# Patient Record
Sex: MALE | Race: WHITE | NOT HISPANIC OR LATINO | Employment: OTHER | URBAN - METROPOLITAN AREA
[De-identification: names, ages, dates, MRNs, and addresses within clinical notes are randomized per-mention and may not be internally consistent; named-entity substitution may affect disease eponyms.]

---

## 2017-01-09 ENCOUNTER — OFFICE VISIT (OUTPATIENT)
Dept: FAMILY MEDICINE CLINIC | Facility: CLINIC | Age: 77
End: 2017-01-09

## 2017-01-09 VITALS
SYSTOLIC BLOOD PRESSURE: 120 MMHG | HEIGHT: 71 IN | TEMPERATURE: 98.7 F | RESPIRATION RATE: 16 BRPM | DIASTOLIC BLOOD PRESSURE: 81 MMHG | HEART RATE: 88 BPM | BODY MASS INDEX: 33.32 KG/M2 | WEIGHT: 238 LBS

## 2017-01-09 DIAGNOSIS — M54.42 ACUTE LEFT-SIDED LOW BACK PAIN WITH LEFT-SIDED SCIATICA: Primary | ICD-10-CM

## 2017-01-09 DIAGNOSIS — R20.2 PARESTHESIA OF LEFT LEG: ICD-10-CM

## 2017-01-09 PROBLEM — M54.40 ACUTE LEFT-SIDED LOW BACK PAIN WITH SCIATICA: Status: ACTIVE | Noted: 2017-01-09

## 2017-01-09 PROBLEM — M54.50 ACUTE LEFT-SIDED LOW BACK PAIN WITHOUT SCIATICA: Status: ACTIVE | Noted: 2017-01-09

## 2017-01-09 PROCEDURE — 99213 OFFICE O/P EST LOW 20 MIN: CPT | Performed by: FAMILY MEDICINE

## 2017-01-09 RX ORDER — METHYLPREDNISOLONE 4 MG/1
TABLET ORAL
Qty: 21 TABLET | Refills: 0 | Status: SHIPPED | OUTPATIENT
Start: 2017-01-09 | End: 2017-01-15

## 2017-01-09 RX ORDER — CYCLOBENZAPRINE HCL 10 MG
10 TABLET ORAL 3 TIMES DAILY PRN
Qty: 30 TABLET | Refills: 0 | Status: SHIPPED | OUTPATIENT
Start: 2017-01-09 | End: 2017-01-19

## 2017-01-09 NOTE — MR AVS SNAPSHOT
Pedro Gould   1/9/2017 1:00 PM   Office Visit    Provider:  Hesham Delacruz MD   Department:  [facility]   Dept Phone:  [phone]                Your Full Care Plan              Today's Medication Changes          These changes are accurate as of: 1/9/17  1:26 PM.  If you have any questions, ask your nurse or doctor.               New Medication(s)Ordered:     cyclobenzaprine 10 MG tablet   Commonly known as:  FLEXERIL   Take 1 tablet by mouth 3 (Three) Times a Day As Needed for muscle spasms for up to 10 days.   Started by:  Hesham Delacruz MD       MethylPREDNISolone 4 MG tablet   Commonly known as:  MEDROL (JENNIFER)   Take as directed on package instructions.   Started by:  Hesham Delacruz MD         Stop taking medication(s)listed here:     ergocalciferol 12311 UNITS capsule   Commonly known as:  ERGOCALCIFEROL   Stopped by:  Hesham Delacruz MD                Where to Get Your Medications      These medications were sent to [id number] [place] [address] [phone] [place] [id number] [place] [address]     Phone:  [phone]     cyclobenzaprine 10 MG tablet    MethylPREDNISolone 4 MG tablet                  Your Updated Medication List          This list is accurate as of: 1/9/17  1:26 PM.  Always use your most recent med list.                cyclobenzaprine 10 MG tablet   Commonly known as:  FLEXERIL   Take 1 tablet by mouth 3 (Three) Times a Day As Needed for muscle spasms for up to 10 days.       MethylPREDNISolone 4 MG tablet   Commonly known as:  MEDROL (JENNIFER)   Take as directed on package instructions.       NEXIUM 24HR PO               You Were Diagnosed With        Codes Comments    Acute left-sided low back pain with left-sided sciatica    -  Primary ICD-10-CM: M54.42  ICD-9-CM: 724.2, 724.3     Paresthesia of left leg     ICD-10-CM: R20.2  ICD-9-CM: 782.0     "     Instructions     None    Patient Instructions History      MyChart Signup     [facility] [employer] allows you to send messages to your doctor, view your test results, renew your prescriptions, schedule appointments, and more. To sign up, go to [employer] and click on the Sign Up Now link in the New User? box. Enter your [employer] Activation Code exactly as it appears below along with the last four digits of your Social Security Number and your Date of Birth () to complete the sign-up process. If you do not sign up before the expiration date, you must request a new code.    [employer] Activation Code: [email or web address]  Expires: 2017  1:26 PM    If you have questions, you can email [email or web address] or call [phone] to talk to our [employer] staff. Remember, [employer] is NOT to be used for urgent needs. For medical emergencies, dial 911.               Other Info from Your Visit           Allergies     Celebrex [Celecoxib]  Itching    Penicillins      Percocet [Oxycodone-acetaminophen]  Hallucinations    Hydrocodone  Rash      Reason for Visit     Hip Pain left      Vital Signs     Blood Pressure Pulse Temperature Respirations Height Weight    120/81 88 98.7 °F (37.1 °C) (Oral) 16 70.5\" (179.1 cm) 238 lb (108 kg)    Body Mass Index Smoking Status                33.67 kg/m2 Former Smoker          Problems and Diagnoses Noted     Acute left-sided low back pain without sciatica    Paresthesia of left leg      "

## 2017-10-09 ENCOUNTER — OFFICE VISIT (OUTPATIENT)
Dept: FAMILY MEDICINE CLINIC | Facility: CLINIC | Age: 77
End: 2017-10-09

## 2017-10-09 VITALS
BODY MASS INDEX: 34.86 KG/M2 | WEIGHT: 249 LBS | HEIGHT: 71 IN | TEMPERATURE: 97.9 F | HEART RATE: 67 BPM | RESPIRATION RATE: 16 BRPM | DIASTOLIC BLOOD PRESSURE: 84 MMHG | SYSTOLIC BLOOD PRESSURE: 135 MMHG

## 2017-10-09 DIAGNOSIS — L72.3 SEBACEOUS CYST: Primary | ICD-10-CM

## 2017-10-09 DIAGNOSIS — Z23 IMMUNIZATION DUE: ICD-10-CM

## 2017-10-09 PROBLEM — M54.50 ACUTE LEFT-SIDED LOW BACK PAIN WITHOUT SCIATICA: Status: RESOLVED | Noted: 2017-01-09 | Resolved: 2017-10-09

## 2017-10-09 PROCEDURE — 99213 OFFICE O/P EST LOW 20 MIN: CPT | Performed by: FAMILY MEDICINE

## 2017-10-09 PROCEDURE — G0008 ADMIN INFLUENZA VIRUS VAC: HCPCS | Performed by: FAMILY MEDICINE

## 2017-10-09 PROCEDURE — 90662 IIV NO PRSV INCREASED AG IM: CPT | Performed by: FAMILY MEDICINE

## 2017-10-09 NOTE — PROGRESS NOTES
"Chief Complaint   Patient presents with   • Cyst     back of neck       Subjective   Pt is here to reevaluate posterior neck cyst. It has been present for 14 months and is progressively enlarging. Sx include some itching and discomfort.  He also needs his flu shot today.   I have reviewed and updated his medications, medical history and problem list during today's office visit.        Social History   Substance Use Topics   • Smoking status: Former Smoker   • Smokeless tobacco: Never Used      Comment: PIPE   • Alcohol use No      Comment: occasional       Review of Systems   Skin:        See hpi       Objective   /84  Pulse 67  Temp 97.9 °F (36.6 °C) (Oral)   Resp 16  Ht 70.5\" (179.1 cm)  Wt 249 lb (113 kg)  BMI 35.22 kg/m2  Body mass index is 35.22 kg/(m^2).  Physical Exam   Constitutional: No distress.   Neck:   See photo below   Vitals reviewed.        4 cm diameter cyst, posterior neck  Data Reviewed:        Assessment/Plan     Problem List Items Addressed This Visit        Musculoskeletal and Integument    Sebaceous cyst; posterior left neck - Primary    Relevant Orders    Ambulatory Referral to General Surgery      Other Visit Diagnoses     Immunization due        Relevant Orders    Flu Vaccine High Dose PF 65YR+ (Completed)          Outpatient Encounter Prescriptions as of 10/9/2017   Medication Sig Dispense Refill   • Esomeprazole Magnesium (NEXIUM 24HR PO) Take 1 capsule by mouth As Needed.       No facility-administered encounter medications on file as of 10/9/2017.        Orders Placed This Encounter   Procedures   • Flu Vaccine High Dose PF 65YR+   • Ambulatory Referral to General Surgery     Referral Priority:   Routine     Referral Type:   Consultation     Referral Reason:   Specialty Services Required     Requested Specialty:   General Surgery     Number of Visits Requested:   1       Continue with current treatment plan.         RTC as needed or sooner if symptoms worsen or change  "

## 2017-10-11 ENCOUNTER — OFFICE VISIT (OUTPATIENT)
Dept: SURGERY | Facility: CLINIC | Age: 77
End: 2017-10-11

## 2017-10-11 VITALS
HEIGHT: 70 IN | HEART RATE: 83 BPM | SYSTOLIC BLOOD PRESSURE: 128 MMHG | DIASTOLIC BLOOD PRESSURE: 79 MMHG | WEIGHT: 249.9 LBS | OXYGEN SATURATION: 96 % | BODY MASS INDEX: 35.78 KG/M2

## 2017-10-11 DIAGNOSIS — L72.3 SEBACEOUS CYST: Primary | ICD-10-CM

## 2017-10-11 PROCEDURE — 99203 OFFICE O/P NEW LOW 30 MIN: CPT | Performed by: SURGERY

## 2017-10-11 NOTE — H&P
Chief Complaint   Patient presents with   • Cyst        Patient is a 77 y.o. male referred by Hesham Delacruz MD for Evaluation of a mass on the back of his neck.  Patient reports he first noticed this about a year and a half ago but had increased considerably in size and is sore.  Patient denies fever, chills, nausea or vomiting.  Patient reports it used to be about 2 cm and now it is 4 cm.     Past Medical History:   Diagnosis Date   • Arthritis    • Arthritis of knee, left    • ED (erectile dysfunction)    • Erectile dysfunction    • Family history of diabetes mellitus     sister   • Family history of ischemic heart disease     father   • Family history of malignant neoplasm of gastrointestinal tract     mother   • GERD (gastroesophageal reflux disease)    • GERD (gastroesophageal reflux disease)    • History of colonoscopy     never   • Irritant dermatitis     forearms   • Nocturia    • Rotator cuff syndrome    • Screening for prostate cancer     unknown   • Sensory neuropathy     bilateral foot     Past Surgical History:   Procedure Laterality Date   • EYE SURGERY  2014    laser   • JOINT REPLACEMENT Right 03/16/2010   • KNEE SURGERY  04/16/2010    ALSO 7/10 & 9/10; partial revision due to infection R knee; total removal of R knee hardware; reimplantation R knee   • 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      Vitals:    10/11/17 0905   BP: 128/79   BP  "Location: Left arm   Patient Position: Sitting   Cuff Size: Adult   Pulse: 83   SpO2: 96%   Weight: 249 lb 14.4 oz (113 kg)   Height: 70\" (177.8 cm)       Physical Exam  General/physcological:   Alert and oriented x3 in no acute distress  HEENT: Normal cephalic, atraumatic, PERRLA, EOMI, sclera anicteric, moist mucous membranes, neck is supple, no JVD, no carotid bruits, no thyromegaly no adenopathy, patient has a large raised sebaceous cyst on the posterior aspect of his left side of his neck  Respiratory: CTA and percussion  CVA: RRR, normal S1-S2, no murmurs, no gallops or rubs  GI: Positive BS, soft, nondistended, nontender, no rebound, no guarding, no hernias, no organomegaly and no masses  Musculoskeletal: Full range of motion, no clubbing, no cyanosis or edema  Neurovascular: Grossly intact    Patient does not use tobacco products currently and I have counseled the patient to not start using tobacco products in the future.    Assessment:  Posterior neck sebaceous cyst, symptomatic  Plan:  I have recommended that the patient undergo excision of the sebaceous cyst to prevent symptoms and reoccurring infections.  I have discussed this procedure in detail with the patient.  I have discussed the risk of anesthesia, bleeding, infection, and recurrence.  Patient understands these risk, benefits and alternatives and wishes to proceed.  I have him scheduled at his earliest convenience.    Mary Alice Centeno MD  General, Minimally Invasive and Endoscopic Surgery  [facility]    [address], [address]  [place], [place], [facility]  P: [phone]  F: [phone]    Cc:  Hesham Delacruz MD                      "

## 2017-10-11 NOTE — PROGRESS NOTES
Chief Complaint   Patient presents with   • Cyst        Patient is a 77 y.o. male referred by Hesham Delacruz MD for Evaluation of a mass on the back of his neck.  Patient reports he first noticed this about a year and a half ago but had increased considerably in size and is sore.  Patient denies fever, chills, nausea or vomiting.  Patient reports it used to be about 2 cm and now it is 4 cm.     Past Medical History:   Diagnosis Date   • Arthritis    • Arthritis of knee, left    • ED (erectile dysfunction)    • Erectile dysfunction    • Family history of diabetes mellitus     sister   • Family history of ischemic heart disease     father   • Family history of malignant neoplasm of gastrointestinal tract     mother   • GERD (gastroesophageal reflux disease)    • GERD (gastroesophageal reflux disease)    • History of colonoscopy     never   • Irritant dermatitis     forearms   • Nocturia    • Rotator cuff syndrome    • Screening for prostate cancer     unknown   • Sensory neuropathy     bilateral foot     Past Surgical History:   Procedure Laterality Date   • EYE SURGERY  2014    laser   • JOINT REPLACEMENT Right 03/16/2010   • KNEE SURGERY  04/16/2010    ALSO 7/10 & 9/10; partial revision due to infection R knee; total removal of R knee hardware; reimplantation R knee   • 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      Vitals:    10/11/17 0905   BP: 128/79   BP  "Location: Left arm   Patient Position: Sitting   Cuff Size: Adult   Pulse: 83   SpO2: 96%   Weight: 249 lb 14.4 oz (113 kg)   Height: 70\" (177.8 cm)       Physical Exam  General/physcological:   Alert and oriented x3 in no acute distress  HEENT: Normal cephalic, atraumatic, PERRLA, EOMI, sclera anicteric, moist mucous membranes, neck is supple, no JVD, no carotid bruits, no thyromegaly no adenopathy, patient has a large raised sebaceous cyst on the posterior aspect of his left side of his neck  Respiratory: CTA and percussion  CVA: RRR, normal S1-S2, no murmurs, no gallops or rubs  GI: Positive BS, soft, nondistended, nontender, no rebound, no guarding, no hernias, no organomegaly and no masses  Musculoskeletal: Full range of motion, no clubbing, no cyanosis or edema  Neurovascular: Grossly intact    Patient does not use tobacco products currently and I have counseled the patient to not start using tobacco products in the future.    Assessment:  Posterior neck sebaceous cyst, symptomatic  Plan:  I have recommended that the patient undergo excision of the sebaceous cyst to prevent symptoms and reoccurring infections.  I have discussed this procedure in detail with the patient.  I have discussed the risk of anesthesia, bleeding, infection, and recurrence.  Patient understands these risk, benefits and alternatives and wishes to proceed.  I have him scheduled at his earliest convenience.    Mary Alice Centeno MD  General, Minimally Invasive and Endoscopic Surgery  [facility] [place]    [address], [address]  [place], [place], [facility]  P: [phone]  F: [phone]    Cc:  Hesham Delacruz MD                    "

## 2017-11-03 ENCOUNTER — OUTSIDE FACILITY SERVICE (OUTPATIENT)
Dept: SURGERY | Facility: CLINIC | Age: 77
End: 2017-11-03

## 2017-11-03 PROCEDURE — 11404 EXC TR-EXT B9+MARG 3.1-4 CM: CPT | Performed by: SURGERY

## 2017-11-03 PROCEDURE — 12042 INTMD RPR N-HF/GENIT2.6-7.5: CPT | Performed by: SURGERY

## 2017-11-03 PROCEDURE — 88304 TISSUE EXAM BY PATHOLOGIST: CPT | Performed by: SURGERY

## 2017-11-06 ENCOUNTER — LAB REQUISITION (OUTPATIENT)
Dept: LAB | Facility: HOSPITAL | Age: 77
End: 2017-11-06

## 2017-11-06 DIAGNOSIS — L72.3 SEBACEOUS CYST: ICD-10-CM

## 2017-11-07 LAB
LAB AP CASE REPORT: NORMAL
LAB AP CLINICAL INFORMATION: NORMAL
Lab: NORMAL
PATH REPORT.FINAL DX SPEC: NORMAL
PATH REPORT.GROSS SPEC: NORMAL

## 2017-11-15 ENCOUNTER — OFFICE VISIT (OUTPATIENT)
Dept: SURGERY | Facility: CLINIC | Age: 77
End: 2017-11-15

## 2017-11-15 VITALS
HEART RATE: 79 BPM | OXYGEN SATURATION: 96 % | DIASTOLIC BLOOD PRESSURE: 81 MMHG | SYSTOLIC BLOOD PRESSURE: 129 MMHG | BODY MASS INDEX: 35.13 KG/M2 | HEIGHT: 71 IN | WEIGHT: 250.9 LBS

## 2017-11-15 DIAGNOSIS — Z09 FOLLOW UP: Primary | ICD-10-CM

## 2017-11-15 PROCEDURE — 99024 POSTOP FOLLOW-UP VISIT: CPT | Performed by: SURGERY

## 2017-11-15 NOTE — PROGRESS NOTES
"Chief complaint:  Post-op  Follow up    History of Present Illness    This is Pedro Gould 77 y.o. status post excision of sebaceous cyst and is doing very well.  Patient denies fever, chills, nausea or vomiting.  Patient's pain is well-controlled.      The following portions of the patient's history were reviewed and updated as appropriate: allergies, current medications, past family history, past medical history, past social history, past surgical history and problem list.    Vitals:    11/15/17 0918   BP: 129/81   BP Location: Left arm   Patient Position: Sitting   Cuff Size: Adult   Pulse: 79   SpO2: 96%   Weight: 250 lb 14.4 oz (114 kg)   Height: 70.5\" (179.1 cm)       Physical Exam  Incision is well-healed without evidence of infection.Stitches were removed.    Patient does not use tobacco products currently and I have counseled the patient not to start using tobacco products in the future.    Assessment/plan:    This is Pedro Gould 77 y.o. status post excision of sebaceous cyst and is doing very well.  I have instructed the patient follow-up as needed.    Mary Alice Centeno MD  General, Minimally Invasive and Endoscopic Surgery  [facility]    [address], [address]  [place], [place], [address]  P: [phone]  F: [phone]    Cc:  Hesham Delacruz MD  "

## 2020-02-10 ENCOUNTER — OFFICE VISIT (OUTPATIENT)
Dept: FAMILY MEDICINE CLINIC | Facility: CLINIC | Age: 80
End: 2020-02-10

## 2020-02-10 VITALS
HEIGHT: 71 IN | RESPIRATION RATE: 16 BRPM | SYSTOLIC BLOOD PRESSURE: 120 MMHG | BODY MASS INDEX: 33.18 KG/M2 | TEMPERATURE: 98.4 F | HEART RATE: 75 BPM | DIASTOLIC BLOOD PRESSURE: 80 MMHG | OXYGEN SATURATION: 96 % | WEIGHT: 237 LBS

## 2020-02-10 DIAGNOSIS — J06.9 URI, ACUTE: Primary | ICD-10-CM

## 2020-02-10 PROCEDURE — 99213 OFFICE O/P EST LOW 20 MIN: CPT | Performed by: FAMILY MEDICINE

## 2020-02-10 RX ORDER — DOXYCYCLINE HYCLATE 100 MG
100 TABLET ORAL 2 TIMES DAILY
Qty: 20 TABLET | Refills: 0 | Status: SHIPPED | OUTPATIENT
Start: 2020-02-10 | End: 2020-02-20

## 2020-02-10 NOTE — PATIENT INSTRUCTIONS

## 2020-02-10 NOTE — PROGRESS NOTES
"   Subjective       Chief Complaint   Patient presents with   • Cough   • Wheezing         HPI:       Pedro Gould is a 79 y.o. male who presents to the office today because of cough.  His symptoms started 1 to 2 weeks ago.  He treated with over-the-counter Claritin with some results.  His symptoms return slightly today.  Cough productive of green mucus.  No fever.    I have reviewed and updated his medications, medical history and problem list during today's office visit.     Social History     Tobacco Use   • Smoking status: Former Smoker   • Smokeless tobacco: Never Used   • Tobacco comment: PIPE   Substance Use Topics   • Alcohol use: No     Comment: occasional       Review of Systems   Constitutional: Negative for fever.   HENT: Positive for congestion.    Respiratory: Positive for cough.          PE:   Objective   /80   Pulse 75   Temp 98.4 °F (36.9 °C) (Oral)   Resp 16   Ht 179.1 cm (70.5\")   Wt 108 kg (237 lb)   SpO2 96%   BMI 33.53 kg/m²     Body mass index is 33.53 kg/m².    Physical Exam   Constitutional: He is oriented to person, place, and time. He appears ill (mild).   HENT:   Right Ear: Tympanic membrane normal.   Left Ear: Tympanic membrane normal.   Nose: Mucosal edema present. Right sinus exhibits no maxillary sinus tenderness and no frontal sinus tenderness. Left sinus exhibits no maxillary sinus tenderness and no frontal sinus tenderness.   Mouth/Throat: Posterior oropharyngeal erythema present.   Eyes: Conjunctivae are normal.   Cardiovascular: Normal rate, regular rhythm and normal heart sounds.   Pulmonary/Chest: Effort normal.   Lymphadenopathy:     He has no cervical adenopathy.   Neurological: He is alert and oriented to person, place, and time.   Skin: Skin is warm. He is not diaphoretic.   Psychiatric: He is attentive.        Data Reviewed:                      A/P:     Assessment/Plan   Diagnoses and all orders for this visit:    1. URI, acute (Primary)  Comments:  New " problem.  Suspect viral etiology.  If symptoms persist through Wednesday he will fill doxycycline prescription and take for 10 days  Orders:  -     doxycycline (VIBRAMYICN) 100 MG tablet; Take 1 tablet by mouth 2 (Two) Times a Day for 10 days. No dairy products within 2 hours of a dose  Dispense: 20 tablet; Refill: 0          Follow up:    Return in about 3 months (around 5/10/2020) for Medicare Wellness and regular visit, 30 minutes.

## 2020-12-03 ENCOUNTER — APPOINTMENT (OUTPATIENT)
Dept: CT IMAGING | Facility: HOSPITAL | Age: 80
End: 2020-12-03

## 2020-12-03 ENCOUNTER — APPOINTMENT (OUTPATIENT)
Dept: GENERAL RADIOLOGY | Facility: HOSPITAL | Age: 80
End: 2020-12-03

## 2020-12-03 ENCOUNTER — APPOINTMENT (OUTPATIENT)
Dept: ULTRASOUND IMAGING | Facility: HOSPITAL | Age: 80
End: 2020-12-03

## 2020-12-03 ENCOUNTER — HOSPITAL ENCOUNTER (INPATIENT)
Facility: HOSPITAL | Age: 80
LOS: 3 days | Discharge: HOME | End: 2020-12-06
Attending: EMERGENCY MEDICINE | Admitting: INTERNAL MEDICINE

## 2020-12-03 DIAGNOSIS — K80.10 CALCULUS OF GALLBLADDER WITH CHOLECYSTITIS WITHOUT BILIARY OBSTRUCTION, UNSPECIFIED CHOLECYSTITIS ACUITY: ICD-10-CM

## 2020-12-03 DIAGNOSIS — K85.90 ACUTE PANCREATITIS, UNSPECIFIED COMPLICATION STATUS, UNSPECIFIED PANCREATITIS TYPE: Primary | ICD-10-CM

## 2020-12-03 PROBLEM — D72.829 LEUKOCYTOSIS: Status: ACTIVE | Noted: 2020-12-03

## 2020-12-03 PROBLEM — K80.20 CHOLELITHIASIS: Status: ACTIVE | Noted: 2020-12-03

## 2020-12-03 PROBLEM — R80.9 PROTEINURIA: Status: ACTIVE | Noted: 2020-12-03

## 2020-12-03 PROBLEM — E66.9 OBESITY (BMI 30-39.9): Status: ACTIVE | Noted: 2020-12-03

## 2020-12-03 PROBLEM — E87.1 HYPONATREMIA: Status: ACTIVE | Noted: 2020-12-03

## 2020-12-03 LAB
ALBUMIN SERPL-MCNC: 4.2 G/DL (ref 3.5–5.2)
ALBUMIN/GLOB SERPL: 1.4 G/DL
ALP SERPL-CCNC: 110 U/L (ref 39–117)
ALT SERPL W P-5'-P-CCNC: 161 U/L (ref 1–41)
ANION GAP SERPL CALCULATED.3IONS-SCNC: 9.8 MMOL/L (ref 5–15)
AST SERPL-CCNC: 40 U/L (ref 1–40)
B PARAPERT DNA SPEC QL NAA+PROBE: NOT DETECTED
B PERT DNA SPEC QL NAA+PROBE: NOT DETECTED
BACTERIA UR QL AUTO: ABNORMAL /HPF
BASOPHILS # BLD AUTO: 0.05 10*3/MM3 (ref 0–0.2)
BASOPHILS NFR BLD AUTO: 0.3 % (ref 0–1.5)
BILIRUB SERPL-MCNC: 2.7 MG/DL (ref 0–1.2)
BILIRUB UR QL STRIP: NEGATIVE
BUN SERPL-MCNC: 14 MG/DL (ref 8–23)
BUN/CREAT SERPL: 13.9 (ref 7–25)
C PNEUM DNA NPH QL NAA+NON-PROBE: NOT DETECTED
CALCIUM SPEC-SCNC: 9.1 MG/DL (ref 8.6–10.5)
CHLORIDE SERPL-SCNC: 95 MMOL/L (ref 98–107)
CLARITY UR: CLEAR
CO2 SERPL-SCNC: 26.2 MMOL/L (ref 22–29)
COLOR UR: ABNORMAL
CREAT SERPL-MCNC: 1.01 MG/DL (ref 0.76–1.27)
DEPRECATED RDW RBC AUTO: 41.3 FL (ref 37–54)
EOSINOPHIL # BLD AUTO: 0.05 10*3/MM3 (ref 0–0.4)
EOSINOPHIL NFR BLD AUTO: 0.3 % (ref 0.3–6.2)
ERYTHROCYTE [DISTWIDTH] IN BLOOD BY AUTOMATED COUNT: 12.4 % (ref 12.3–15.4)
FLUAV SUBTYP SPEC NAA+PROBE: NOT DETECTED
FLUBV RNA ISLT QL NAA+PROBE: NOT DETECTED
GFR SERPL CREATININE-BSD FRML MDRD: 71 ML/MIN/1.73
GLOBULIN UR ELPH-MCNC: 2.9 GM/DL
GLUCOSE SERPL-MCNC: 128 MG/DL (ref 65–99)
GLUCOSE UR STRIP-MCNC: NEGATIVE MG/DL
HADV DNA SPEC NAA+PROBE: NOT DETECTED
HCOV 229E RNA SPEC QL NAA+PROBE: NOT DETECTED
HCOV HKU1 RNA SPEC QL NAA+PROBE: NOT DETECTED
HCOV NL63 RNA SPEC QL NAA+PROBE: NOT DETECTED
HCOV OC43 RNA SPEC QL NAA+PROBE: NOT DETECTED
HCT VFR BLD AUTO: 46.5 % (ref 37.5–51)
HGB BLD-MCNC: 15.5 G/DL (ref 13–17.7)
HGB UR QL STRIP.AUTO: ABNORMAL
HMPV RNA NPH QL NAA+NON-PROBE: NOT DETECTED
HOLD SPECIMEN: NORMAL
HOLD SPECIMEN: NORMAL
HPIV1 RNA SPEC QL NAA+PROBE: NOT DETECTED
HPIV2 RNA SPEC QL NAA+PROBE: NOT DETECTED
HPIV3 RNA NPH QL NAA+PROBE: NOT DETECTED
HPIV4 P GENE NPH QL NAA+PROBE: NOT DETECTED
HYALINE CASTS UR QL AUTO: ABNORMAL /LPF
IMM GRANULOCYTES # BLD AUTO: 0.2 10*3/MM3 (ref 0–0.05)
IMM GRANULOCYTES NFR BLD AUTO: 1 % (ref 0–0.5)
KETONES UR QL STRIP: ABNORMAL
LEUKOCYTE ESTERASE UR QL STRIP.AUTO: NEGATIVE
LIPASE SERPL-CCNC: 182 U/L (ref 13–60)
LYMPHOCYTES # BLD AUTO: 0.59 10*3/MM3 (ref 0.7–3.1)
LYMPHOCYTES NFR BLD AUTO: 3 % (ref 19.6–45.3)
M PNEUMO IGG SER IA-ACNC: NOT DETECTED
MCH RBC QN AUTO: 30.2 PG (ref 26.6–33)
MCHC RBC AUTO-ENTMCNC: 33.3 G/DL (ref 31.5–35.7)
MCV RBC AUTO: 90.5 FL (ref 79–97)
MONOCYTES # BLD AUTO: 1.46 10*3/MM3 (ref 0.1–0.9)
MONOCYTES NFR BLD AUTO: 7.4 % (ref 5–12)
NEUTROPHILS NFR BLD AUTO: 17.39 10*3/MM3 (ref 1.7–7)
NEUTROPHILS NFR BLD AUTO: 88 % (ref 42.7–76)
NITRITE UR QL STRIP: NEGATIVE
NRBC BLD AUTO-RTO: 0 /100 WBC (ref 0–0.2)
NT-PROBNP SERPL-MCNC: 176.6 PG/ML (ref 0–1800)
PH UR STRIP.AUTO: 5.5 [PH] (ref 5–8)
PLATELET # BLD AUTO: 247 10*3/MM3 (ref 140–450)
PMV BLD AUTO: 10.7 FL (ref 6–12)
POTASSIUM SERPL-SCNC: 4.3 MMOL/L (ref 3.5–5.2)
PROCALCITONIN SERPL-MCNC: 0.22 NG/ML (ref 0–0.25)
PROT SERPL-MCNC: 7.1 G/DL (ref 6–8.5)
PROT UR QL STRIP: ABNORMAL
QT INTERVAL: 349 MS
RBC # BLD AUTO: 5.14 10*6/MM3 (ref 4.14–5.8)
RBC # UR: ABNORMAL /HPF
REF LAB TEST METHOD: ABNORMAL
RHINOVIRUS RNA SPEC NAA+PROBE: NOT DETECTED
RSV RNA NPH QL NAA+NON-PROBE: NOT DETECTED
SARS-COV-2 RNA NPH QL NAA+NON-PROBE: NOT DETECTED
SODIUM SERPL-SCNC: 131 MMOL/L (ref 136–145)
SP GR UR STRIP: >=1.03 (ref 1–1.03)
SQUAMOUS #/AREA URNS HPF: ABNORMAL /HPF
TROPONIN T SERPL-MCNC: <0.01 NG/ML (ref 0–0.03)
TROPONIN T SERPL-MCNC: <0.01 NG/ML (ref 0–0.03)
UROBILINOGEN UR QL STRIP: ABNORMAL
WBC # BLD AUTO: 19.74 10*3/MM3 (ref 3.4–10.8)
WBC UR QL AUTO: ABNORMAL /HPF
WHOLE BLOOD HOLD SPECIMEN: NORMAL
WHOLE BLOOD HOLD SPECIMEN: NORMAL

## 2020-12-03 PROCEDURE — 93005 ELECTROCARDIOGRAM TRACING: CPT | Performed by: EMERGENCY MEDICINE

## 2020-12-03 PROCEDURE — 93005 ELECTROCARDIOGRAM TRACING: CPT

## 2020-12-03 PROCEDURE — 25010000002 LEVOFLOXACIN PER 250 MG: Performed by: NURSE PRACTITIONER

## 2020-12-03 PROCEDURE — 85025 COMPLETE CBC W/AUTO DIFF WBC: CPT | Performed by: EMERGENCY MEDICINE

## 2020-12-03 PROCEDURE — 81001 URINALYSIS AUTO W/SCOPE: CPT | Performed by: EMERGENCY MEDICINE

## 2020-12-03 PROCEDURE — 83690 ASSAY OF LIPASE: CPT | Performed by: EMERGENCY MEDICINE

## 2020-12-03 PROCEDURE — 84484 ASSAY OF TROPONIN QUANT: CPT | Performed by: EMERGENCY MEDICINE

## 2020-12-03 PROCEDURE — 36415 COLL VENOUS BLD VENIPUNCTURE: CPT

## 2020-12-03 PROCEDURE — 74177 CT ABD & PELVIS W/CONTRAST: CPT

## 2020-12-03 PROCEDURE — 93010 ELECTROCARDIOGRAM REPORT: CPT | Performed by: INTERNAL MEDICINE

## 2020-12-03 PROCEDURE — 71045 X-RAY EXAM CHEST 1 VIEW: CPT

## 2020-12-03 PROCEDURE — 76705 ECHO EXAM OF ABDOMEN: CPT

## 2020-12-03 PROCEDURE — 99284 EMERGENCY DEPT VISIT MOD MDM: CPT

## 2020-12-03 PROCEDURE — 87040 BLOOD CULTURE FOR BACTERIA: CPT | Performed by: NURSE PRACTITIONER

## 2020-12-03 PROCEDURE — 83880 ASSAY OF NATRIURETIC PEPTIDE: CPT | Performed by: EMERGENCY MEDICINE

## 2020-12-03 PROCEDURE — 0202U NFCT DS 22 TRGT SARS-COV-2: CPT | Performed by: EMERGENCY MEDICINE

## 2020-12-03 PROCEDURE — 25010000002 IOPAMIDOL 61 % SOLUTION: Performed by: EMERGENCY MEDICINE

## 2020-12-03 PROCEDURE — 84145 PROCALCITONIN (PCT): CPT | Performed by: EMERGENCY MEDICINE

## 2020-12-03 PROCEDURE — 80053 COMPREHEN METABOLIC PANEL: CPT | Performed by: EMERGENCY MEDICINE

## 2020-12-03 RX ORDER — PANTOPRAZOLE SODIUM 40 MG/10ML
40 INJECTION, POWDER, LYOPHILIZED, FOR SOLUTION INTRAVENOUS
Status: DISCONTINUED | OUTPATIENT
Start: 2020-12-03 | End: 2020-12-06 | Stop reason: HOSPADM

## 2020-12-03 RX ORDER — SODIUM CHLORIDE 0.9 % (FLUSH) 0.9 %
10 SYRINGE (ML) INJECTION AS NEEDED
Status: DISCONTINUED | OUTPATIENT
Start: 2020-12-03 | End: 2020-12-06 | Stop reason: HOSPADM

## 2020-12-03 RX ORDER — SODIUM CHLORIDE 9 MG/ML
125 INJECTION, SOLUTION INTRAVENOUS CONTINUOUS
Status: DISCONTINUED | OUTPATIENT
Start: 2020-12-03 | End: 2020-12-06 | Stop reason: HOSPADM

## 2020-12-03 RX ORDER — ALUMINA, MAGNESIA, AND SIMETHICONE 2400; 2400; 240 MG/30ML; MG/30ML; MG/30ML
15 SUSPENSION ORAL EVERY 6 HOURS PRN
Status: DISCONTINUED | OUTPATIENT
Start: 2020-12-03 | End: 2020-12-06 | Stop reason: HOSPADM

## 2020-12-03 RX ORDER — ONDANSETRON 4 MG/1
4 TABLET, FILM COATED ORAL EVERY 6 HOURS PRN
Status: DISCONTINUED | OUTPATIENT
Start: 2020-12-03 | End: 2020-12-06 | Stop reason: HOSPADM

## 2020-12-03 RX ORDER — NITROGLYCERIN 0.4 MG/1
0.4 TABLET SUBLINGUAL
Status: DISCONTINUED | OUTPATIENT
Start: 2020-12-03 | End: 2020-12-06 | Stop reason: HOSPADM

## 2020-12-03 RX ORDER — SODIUM CHLORIDE 0.9 % (FLUSH) 0.9 %
10 SYRINGE (ML) INJECTION AS NEEDED
Status: DISCONTINUED | OUTPATIENT
Start: 2020-12-03 | End: 2020-12-03

## 2020-12-03 RX ORDER — ONDANSETRON 2 MG/ML
4 INJECTION INTRAMUSCULAR; INTRAVENOUS EVERY 6 HOURS PRN
Status: DISCONTINUED | OUTPATIENT
Start: 2020-12-03 | End: 2020-12-06 | Stop reason: HOSPADM

## 2020-12-03 RX ORDER — BISACODYL 5 MG/1
5 TABLET, DELAYED RELEASE ORAL DAILY PRN
Status: DISCONTINUED | OUTPATIENT
Start: 2020-12-03 | End: 2020-12-06 | Stop reason: HOSPADM

## 2020-12-03 RX ORDER — LEVOFLOXACIN 5 MG/ML
500 INJECTION, SOLUTION INTRAVENOUS EVERY 24 HOURS
Status: DISCONTINUED | OUTPATIENT
Start: 2020-12-03 | End: 2020-12-06 | Stop reason: HOSPADM

## 2020-12-03 RX ORDER — ASPIRIN 325 MG
325 TABLET ORAL ONCE
Status: DISCONTINUED | OUTPATIENT
Start: 2020-12-03 | End: 2020-12-03

## 2020-12-03 RX ORDER — FAMOTIDINE 10 MG/ML
20 INJECTION, SOLUTION INTRAVENOUS EVERY 12 HOURS SCHEDULED
Status: DISCONTINUED | OUTPATIENT
Start: 2020-12-03 | End: 2020-12-03

## 2020-12-03 RX ORDER — BISACODYL 10 MG
10 SUPPOSITORY, RECTAL RECTAL DAILY PRN
Status: DISCONTINUED | OUTPATIENT
Start: 2020-12-03 | End: 2020-12-06 | Stop reason: HOSPADM

## 2020-12-03 RX ADMIN — SODIUM CHLORIDE 125 ML/HR: 9 INJECTION, SOLUTION INTRAVENOUS at 20:43

## 2020-12-03 RX ADMIN — FAMOTIDINE 20 MG: 10 INJECTION INTRAVENOUS at 20:44

## 2020-12-03 RX ADMIN — IOPAMIDOL 100 ML: 612 INJECTION, SOLUTION INTRAVENOUS at 18:00

## 2020-12-03 RX ADMIN — PANTOPRAZOLE SODIUM 40 MG: 40 INJECTION, POWDER, FOR SOLUTION INTRAVENOUS at 23:31

## 2020-12-03 RX ADMIN — LEVOFLOXACIN 500 MG: 5 INJECTION, SOLUTION INTRAVENOUS at 23:31

## 2020-12-03 NOTE — ED PROVIDER NOTES
" EMERGENCY DEPARTMENT ENCOUNTER    Room Number:  BRITT[facility]/I  Date of encounter:  12/3/2020  PCP: Hesham Delacruz MD  Historian: Patient      HPI:  Chief Complaint: Epigastric pain  A complete HPI/ROS/PMH/PSH/SH/FH are unobtainable due to: N/A    Context: Pedro Gould is a 80 y.o. male who presents to the ED c/o feeling a \"knot\" in his epigastrium.  This started Thanksgiving afternoon after he ate pumpkin pie and drink strong coffee.  He took Tums and Nexium the following day which alleviated his symptoms.  His symptoms recurred on Tuesday, he took an additional dose of Nexium and Tums and his symptoms have essentially resolved.  The patient states his wife called his primary care physician who advised that he come to the hospital for further evaluation.  He currently has no complaints.  The epigastric discomfort was not associated with shortness of breath.  He was slightly nauseated but did not vomit.  He has had no black or bloody stools.  No fevers or chills.  No cough, congestion, trouble breathing.  He has had similar episodes of reflux in the past and takes Nexium as needed.      The patient was placed in a mask in triage, hand hygiene was performed before and after my interaction with the patient.  I wore a mask, safety glasses and gloves during my entire interaction with the patient.    PAST MEDICAL HISTORY  Active Ambulatory Problems     Diagnosis Date Noted   • Sensory neuropathy    • GERD (gastroesophageal reflux disease)    • Nocturia    • Tinnitus aurium 12/17/2015   • Vitamin D deficiency 12/17/2015   • Trigger middle finger of right hand 12/01/2016   • Sebaceous cyst; posterior left neck 12/01/2016   • Paresthesia of left leg 01/09/2017     Resolved Ambulatory Problems     Diagnosis Date Noted   • Arthritis of knee, left    • ED (erectile dysfunction)    • Family history of ischemic heart disease    • Family history of diabetes mellitus    • Family history of malignant neoplasm of gastrointestinal tract  "   • Dizziness of unknown cause 12/17/2015   • Acute left-sided low back pain without sciatica 01/09/2017     Past Medical History:   Diagnosis Date   • Arthritis    • Erectile dysfunction    • History of colonoscopy    • Irritant dermatitis    • Rotator cuff syndrome    • Screening for prostate cancer          PAST SURGICAL HISTORY  Past Surgical History:   Procedure Laterality Date   • EYE SURGERY  2014    laser   • JOINT REPLACEMENT Right 03/16/2010   • KNEE SURGERY  04/16/2010    ALSO 7/10 & 9/10; partial revision due to infection R knee; total removal of R knee hardware; reimplantation R knee   • REPLACEMENT TOTAL KNEE Left 09/2015         FAMILY HISTORY  Family History   Problem Relation Age of Onset   • Cancer Mother 55        colon   • Alcohol abuse Father    • Heart disease Father    • Heart attack Father    • Diabetes Other         aunt         SOCIAL HISTORY  Social History     Socioeconomic History   • Marital status:      Spouse name: Not on file   • Number of children: Not on file   • Years of education: Not on file   • Highest education level: Not on file   Tobacco Use   • Smoking status: Former Smoker   • Smokeless tobacco: Never Used   • Tobacco comment: PIPE   Substance and Sexual Activity   • Alcohol use: No     Comment: occasional   • Drug use: No         ALLERGIES  Celebrex [celecoxib], Penicillins, Percocet [oxycodone-acetaminophen], and Hydrocodone        REVIEW OF SYSTEMS  Review of Systems   Constitutional: Negative for activity change, appetite change and fever.   HENT: Negative for congestion and sore throat.    Eyes: Negative.    Respiratory: Negative for cough and shortness of breath.    Cardiovascular: Negative for chest pain and leg swelling.   Gastrointestinal: Positive for abdominal pain and nausea. Negative for diarrhea and vomiting.   Endocrine: Negative.    Genitourinary: Negative for decreased urine volume and dysuria.   Musculoskeletal: Negative for neck pain.   Skin:  Negative for rash and wound.   Allergic/Immunologic: Negative.    Neurological: Negative for weakness, numbness and headaches.   Hematological: Negative.    Psychiatric/Behavioral: Negative.    All other systems reviewed and are negative.       All systems reviewed and negative except for those discussed in HPI.       PHYSICAL EXAM    I have reviewed the triage vital signs and nursing notes.    ED Triage Vitals   Temp Heart Rate Resp BP SpO2   12/03/20 1428 12/03/20 1428 12/03/20 1428 12/03/20 1500 12/03/20 1428   98.8 °F (37.1 °C) (!) 123 18 133/91 96 %      Temp src Heart Rate Source Patient Position BP Location FiO2 (%)   12/03/20 1428 -- 12/03/20 1500 12/03/20 1500 --   Tympanic  Sitting Left arm        Physical Exam   Constitutional: Pt. is oriented to person, place, and time and well-developed, well-nourished, and in no distress. No distress.   HENT: Normocephalic and atraumatic,  EOM are normal. Pupils are equal, round, and reactive to light. Oropharynx moist/nonerythematous.  Neck: Normal range of motion. Neck supple. No JVD present. No tracheal deviation present. No thyromegaly present.   Cardiovascular: Normal rate, regular rhythm and normal heart sounds. Exam reveals no gallop and no friction rub.   No murmur heard.  Pulmonary/Chest: Effort normal and breath sounds normal. No stridor. No respiratory distress. No wheezes, no rales.   Abdominal: Soft. Bowel sounds are normal. No distension. There is no tenderness. There is no rebound and no guarding.   Musculoskeletal: Normal range of motion. No edema, tenderness or deformity.   Neurological: Pt. is alert and oriented to person, place, and time. Pt. has normal sensation and normal strength. No cranial nerve deficit. GCS score is 15.   Skin: Skin is warm and dry. No rash noted. Pt. is not diaphoretic. No erythema.   Psychiatric: Mood, affect and judgment normal.   Nursing note and vitals reviewed.        LAB RESULTS  Recent Results (from the past 24 hour(s))    ECG 12 Lead    Collection Time: 12/03/20  2:32 PM   Result Value Ref Range    QT Interval 349 ms   Comprehensive Metabolic Panel    Collection Time: 12/03/20  4:01 PM    Specimen: Blood   Result Value Ref Range    Glucose 128 (H) 65 - 99 mg/dL    BUN 14 8 - 23 mg/dL    Creatinine 1.01 0.76 - 1.27 mg/dL    Sodium 131 (L) 136 - 145 mmol/L    Potassium 4.3 3.5 - 5.2 mmol/L    Chloride 95 (L) 98 - 107 mmol/L    CO2 26.2 22.0 - 29.0 mmol/L    Calcium 9.1 8.6 - 10.5 mg/dL    Total Protein 7.1 6.0 - 8.5 g/dL    Albumin 4.20 3.50 - 5.20 g/dL    ALT (SGPT) 161 (H) 1 - 41 U/L    AST (SGOT) 40 1 - 40 U/L    Alkaline Phosphatase 110 39 - 117 U/L    Total Bilirubin 2.7 (H) 0.0 - 1.2 mg/dL    eGFR Non African Amer 71 >60 mL/min/1.73    Globulin 2.9 gm/dL    A/G Ratio 1.4 g/dL    BUN/Creatinine Ratio 13.9 7.0 - 25.0    Anion Gap 9.8 5.0 - 15.0 mmol/L   Troponin    Collection Time: 12/03/20  4:01 PM    Specimen: Blood   Result Value Ref Range    Troponin T <0.010 0.000 - 0.030 ng/mL   Light Blue Top    Collection Time: 12/03/20  4:01 PM   Result Value Ref Range    Extra Tube hold for add-on    Green Top (Gel)    Collection Time: 12/03/20  4:01 PM   Result Value Ref Range    Extra Tube Hold for add-ons.    Lavender Top    Collection Time: 12/03/20  4:01 PM   Result Value Ref Range    Extra Tube hold for add-on    Gold Top - SST    Collection Time: 12/03/20  4:01 PM   Result Value Ref Range    Extra Tube Hold for add-ons.    CBC Auto Differential    Collection Time: 12/03/20  4:01 PM    Specimen: Blood   Result Value Ref Range    WBC 19.74 (H) 3.40 - 10.80 10*3/mm3    RBC 5.14 4.14 - 5.80 10*6/mm3    Hemoglobin 15.5 13.0 - 17.7 g/dL    Hematocrit 46.5 37.5 - 51.0 %    MCV 90.5 79.0 - 97.0 fL    MCH 30.2 26.6 - 33.0 pg    MCHC 33.3 31.5 - 35.7 g/dL    RDW 12.4 12.3 - 15.4 %    RDW-SD 41.3 37.0 - 54.0 fl    MPV 10.7 6.0 - 12.0 fL    Platelets 247 140 - 450 10*3/mm3    Neutrophil % 88.0 (H) 42.7 - 76.0 %    Lymphocyte % 3.0 (L) 19.6 -  45.3 %    Monocyte % 7.4 5.0 - 12.0 %    Eosinophil % 0.3 0.3 - 6.2 %    Basophil % 0.3 0.0 - 1.5 %    Immature Grans % 1.0 (H) 0.0 - 0.5 %    Neutrophils, Absolute 17.39 (H) 1.70 - 7.00 10*3/mm3    Lymphocytes, Absolute 0.59 (L) 0.70 - 3.10 10*3/mm3    Monocytes, Absolute 1.46 (H) 0.10 - 0.90 10*3/mm3    Eosinophils, Absolute 0.05 0.00 - 0.40 10*3/mm3    Basophils, Absolute 0.05 0.00 - 0.20 10*3/mm3    Immature Grans, Absolute 0.20 (H) 0.00 - 0.05 10*3/mm3    nRBC 0.0 0.0 - 0.2 /100 WBC   BNP    Collection Time: 12/03/20  4:01 PM    Specimen: Blood   Result Value Ref Range    proBNP 176.6 0.0-1,800.0 pg/mL   Troponin    Collection Time: 12/03/20  4:40 PM    Specimen: Blood   Result Value Ref Range    Troponin T <0.010 0.000 - 0.030 ng/mL   Lipase    Collection Time: 12/03/20  4:40 PM    Specimen: Blood   Result Value Ref Range    Lipase 182 (H) 13 - 60 U/L   Procalcitonin    Collection Time: 12/03/20  4:40 PM    Specimen: Blood   Result Value Ref Range    Procalcitonin 0.22 0.00 - 0.25 ng/mL   Urinalysis With Microscopic If Indicated (No Culture) - Urine, Clean Catch    Collection Time: 12/03/20  5:32 PM    Specimen: Urine, Clean Catch   Result Value Ref Range    Color, UA Orange (A) Yellow, Straw    Appearance, UA Clear Clear    pH, UA 5.5 5.0 - 8.0    Specific Gravity, UA >=1.030 1.005 - 1.030    Glucose, UA Negative Negative    Ketones, UA 80 mg/dL (3+) (A) Negative    Bilirubin, UA Negative Negative    Blood, UA Moderate (2+) (A) Negative    Protein, UA >=300 mg/dL (3+) (A) Negative    Leuk Esterase, UA Negative Negative    Nitrite, UA Negative Negative    Urobilinogen, UA 1.0 E.U./dL 0.2 - 1.0 E.U./dL   Urinalysis, Microscopic Only - Urine, Clean Catch    Collection Time: 12/03/20  5:32 PM    Specimen: Urine, Clean Catch   Result Value Ref Range    RBC, UA 3-5 (A) None Seen, 0-2 /HPF    WBC, UA 0-2 None Seen, 0-2 /HPF    Bacteria, UA None Seen None Seen /HPF    Squamous Epithelial Cells, UA 0-2 None Seen, 0-2  /HPF    Hyaline Casts, UA None Seen None Seen /LPF    Methodology Manual Light Microscopy    Respiratory Panel PCR w/COVID-19(SARS-CoV-2) DAVID/SALVADOR/JENNIFER/[facility]/PAKO In-House, NP Swab in UTM/VTM, 3-4 HR TAT - Swab, Nasopharynx    Collection Time: 12/03/20  7:36 PM    Specimen: Nasopharynx; Swab   Result Value Ref Range    ADENOVIRUS, PCR Not Detected Not Detected    Coronavirus 229E Not Detected Not Detected    Coronavirus HKU1 Not Detected Not Detected    Coronavirus NL63 Not Detected Not Detected    Coronavirus OC43 Not Detected Not Detected    COVID19 Not Detected Not Detected - Ref. Range    Human Metapneumovirus Not Detected Not Detected    Human Rhinovirus/Enterovirus Not Detected Not Detected    Influenza A PCR Not Detected Not Detected    Influenza B PCR Not Detected Not Detected    Parainfluenza Virus 1 Not Detected Not Detected    Parainfluenza Virus 2 Not Detected Not Detected    Parainfluenza Virus 3 Not Detected Not Detected    Parainfluenza Virus 4 Not Detected Not Detected    RSV, PCR Not Detected Not Detected    Bordetella pertussis pcr Not Detected Not Detected    Bordetella parapertussis PCR Not Detected Not Detected    Chlamydophila pneumoniae PCR Not Detected Not Detected    Mycoplasma pneumo by PCR Not Detected Not Detected       Ordered the above labs and independently reviewed the results.        RADIOLOGY  Ct Abdomen Pelvis With Contrast    Result Date: 12/3/2020  CT ABDOMEN AND PELVIS WITH IV CONTRAST  HISTORY: Epigastric pain. Pancreatitis.  TECHNIQUE: CT includes axial imaging from the lung bases to the trochanters with IV contrast.  COMPARISON: None  FINDINGS: There is dependent lower lobe atelectasis. A small-to-moderate hiatal hernia is present. There is diffuse fat infiltration of the liver. Multiple gallstones fill the gallbladder without gallbladder distention or pericholecystic inflammation. The splenic size is normal. Adrenal glands appear normal. There is abnormal peripancreatic  stranding that is consistent with pancreatitis. There is no evidence for splenic vein thrombosis. Within the central upper anterior pancreatic body there is a pancreatic low density lesion measuring 1.1 cm. Wall thickening is present in the descending portion of the duodenum most likely related to secondary duodenitis. Small bilateral renal cysts are present. There is no hydronephrosis. There is no bowel dilatation or evidence for bowel obstruction. Sigmoid diverticulosis is present without evidence for diverticulitis.      1. Acute pancreatitis with peripancreatic inflammation. Descending duodenal wall thickening most likely due to secondary duodenitis. 2. 11 mm low-density lesion within the pancreatic body. Current ACR recommendations for management of a less than 1.5 cm incidental pancreatic cyst in a patient of 65-79 years at presentation is to reimage every 2 years x 5 and stop if stable over 10 years. 3. Cholelithiasis. Gallbladder wall is not thickened and there is no biliary duct dilatation to suggest gallstone pancreatitis. 4. Small hiatal hernia. Bibasilar atelectasis.  Radiation dose reduction techniques were utilized, including automated exposure control and exposure modulation based on body size.  This report was finalized on 12/3/2020 6:43 PM by Dr. Hema Lynne M.D.      Xr Chest 1 View    Result Date: 12/3/2020  CHEST SINGLE VIEW  HISTORY: Mid chest pain. Hiatal hernia.  COMPARISON: None  FINDINGS: Heart size is borderline enlarged. Aortic vascular calcifications are present. The lungs appear clear and there is no evidence for pulmonary edema or pleural effusion or infiltrate. Cardiac monitor and leads are noted.      No evidence for active disease in the chest.  This report was finalized on 12/3/2020 5:40 PM by Dr. Hema Lynne M.D.        I ordered the above noted radiological studies. Reviewed by me and discussed with radiologist.  See dictation for official radiology  interpretation.      PROCEDURES    Procedures      MEDICATIONS GIVEN IN ER    Medications   sodium chloride 0.9 % flush 10 mL (has no administration in time range)   nitroglycerin (NITROSTAT) SL tablet 0.4 mg (has no administration in time range)   bisacodyl (DULCOLAX) suppository 10 mg (has no administration in time range)   bisacodyl (DULCOLAX) EC tablet 5 mg (has no administration in time range)   ondansetron (ZOFRAN) tablet 4 mg (has no administration in time range)     Or   ondansetron (ZOFRAN) injection 4 mg (has no administration in time range)   aluminum-magnesium hydroxide-simethicone (MAALOX MAX) 400-400-40 MG/5ML suspension 15 mL (has no administration in time range)   famotidine (PEPCID) injection 20 mg (20 mg Intravenous Given 12/3/20 2044)   sodium chloride 0.9 % infusion (125 mL/hr Intravenous New Bag 12/3/20 2043)   levoFLOXacin (LEVAQUIN) 500 mg/100 mL D5W (premix) (LEVAQUIN) 500 mg (has no administration in time range)   iopamidol (ISOVUE-300) 61 % injection 100 mL (100 mL Intravenous Given by Other 12/3/20 1800)         PROGRESS, DATA ANALYSIS, CONSULTS, AND MEDICAL DECISION MAKING    Any/all labs have been independently reviewed by me.  Any/all radiology studies have been reviewed by me and discussed with radiologist dictating the report.   EKG's independently viewed and interpreted by me.  Discussion below represents my analysis of pertinent findings related to patient's condition, differential diagnosis, treatment plan and final disposition.      ED Course as of Dec 03 2131   Thu Dec 03, 2020   1632 EKG performed at 1432 and interpreted by me shows sinus tachycardia with a heart of 103 bpm.  WV intervals are normal.  There is a right bundle branch block and left anterior fascicular block with nonspecific ST-T changes.  There are no prior EKGs available for comparison.    [WC]   1642 Symptoms are likely GI in origin.  CBC, CMP, troponin and BNP will be obtained.    [WC]   1652 WBC(!): 19.74 [WC]    1652 Hemoglobin: 15.5 [WC]   1652 Hematocrit: 46.5 [WC]   1652 Platelets: 247 [WC]   1730 X-ray is unremarkable.    [WC]   1734 Lipase(!): 182 [WC]   1739 ALT (SGPT)(!): 161 [WC]   1739 Total Bilirubin(!): 2.7 [WC]   1857 Case discussed with Dr. Villanueva ([facility])-accepts the patient for admission to a telemetry bed.    [WC]   2045 COVID19: Not Detected [WC]      ED Course User Index  [WC] Franko Davey MD       AS OF 21:31 EST VITALS:    BP - 123/70  HR - 94  TEMP - 98.8 °F (37.1 °C) (Tympanic)  02 SATS - 92%        DIAGNOSIS  Final diagnoses:   Acute pancreatitis, unspecified complication status, unspecified pancreatitis type         DISPOSITION  Admitted-telemetry           Franko Davey MD  12/03/20 1858       Franko Davey MD  12/03/20 2131

## 2020-12-04 ENCOUNTER — APPOINTMENT (OUTPATIENT)
Dept: MRI IMAGING | Facility: HOSPITAL | Age: 80
End: 2020-12-04

## 2020-12-04 PROBLEM — K80.10 CALCULUS OF GALLBLADDER WITH CHOLECYSTITIS WITHOUT BILIARY OBSTRUCTION: Status: ACTIVE | Noted: 2020-12-03

## 2020-12-04 PROBLEM — K85.10 ACUTE BILIARY PANCREATITIS WITHOUT INFECTION OR NECROSIS: Status: ACTIVE | Noted: 2020-12-03

## 2020-12-04 LAB
ALBUMIN SERPL-MCNC: 3.7 G/DL (ref 3.5–5.2)
ALBUMIN/GLOB SERPL: 1.4 G/DL
ALP SERPL-CCNC: 90 U/L (ref 39–117)
ALT SERPL W P-5'-P-CCNC: 103 U/L (ref 1–41)
ANION GAP SERPL CALCULATED.3IONS-SCNC: 10.7 MMOL/L (ref 5–15)
AST SERPL-CCNC: 29 U/L (ref 1–40)
BACTERIA UR QL AUTO: ABNORMAL /HPF
BILIRUB SERPL-MCNC: 2 MG/DL (ref 0–1.2)
BILIRUB UR QL STRIP: NEGATIVE
BUN SERPL-MCNC: 14 MG/DL (ref 8–23)
BUN/CREAT SERPL: 15.4 (ref 7–25)
CALCIUM SPEC-SCNC: 8.5 MG/DL (ref 8.6–10.5)
CHLORIDE SERPL-SCNC: 99 MMOL/L (ref 98–107)
CLARITY UR: CLEAR
CO2 SERPL-SCNC: 24.3 MMOL/L (ref 22–29)
COARSE GRAN CASTS URNS QL MICRO: ABNORMAL /LPF
COLOR UR: ABNORMAL
CREAT SERPL-MCNC: 0.91 MG/DL (ref 0.76–1.27)
CREAT UR-MCNC: 164 MG/DL
DEPRECATED RDW RBC AUTO: 38.5 FL (ref 37–54)
ERYTHROCYTE [DISTWIDTH] IN BLOOD BY AUTOMATED COUNT: 12.2 % (ref 12.3–15.4)
GFR SERPL CREATININE-BSD FRML MDRD: 81 ML/MIN/1.73
GLOBULIN UR ELPH-MCNC: 2.7 GM/DL
GLUCOSE SERPL-MCNC: 131 MG/DL (ref 65–99)
GLUCOSE UR STRIP-MCNC: NEGATIVE MG/DL
HBA1C MFR BLD: 6.2 % (ref 4.8–5.6)
HCT VFR BLD AUTO: 39.3 % (ref 37.5–51)
HGB BLD-MCNC: 13.3 G/DL (ref 13–17.7)
HGB UR QL STRIP.AUTO: ABNORMAL
HYALINE CASTS UR QL AUTO: ABNORMAL /LPF
INR PPP: 1.22 (ref 0.9–1.1)
KETONES UR QL STRIP: ABNORMAL
LEUKOCYTE ESTERASE UR QL STRIP.AUTO: NEGATIVE
LIPASE SERPL-CCNC: 84 U/L (ref 13–60)
MCH RBC QN AUTO: 29.6 PG (ref 26.6–33)
MCHC RBC AUTO-ENTMCNC: 33.8 G/DL (ref 31.5–35.7)
MCV RBC AUTO: 87.3 FL (ref 79–97)
NITRITE UR QL STRIP: NEGATIVE
PH UR STRIP.AUTO: 5.5 [PH] (ref 5–8)
PLATELET # BLD AUTO: 226 10*3/MM3 (ref 140–450)
PMV BLD AUTO: 10.5 FL (ref 6–12)
POTASSIUM SERPL-SCNC: 3.8 MMOL/L (ref 3.5–5.2)
PROT SERPL-MCNC: 6.4 G/DL (ref 6–8.5)
PROT UR QL STRIP: ABNORMAL
PROT UR-MCNC: 131 MG/DL
PROT/CREAT UR: 798.8 MG/G CREA (ref 0–200)
PROTHROMBIN TIME: 15.2 SECONDS (ref 11.7–14.2)
RBC # BLD AUTO: 4.5 10*6/MM3 (ref 4.14–5.8)
RBC # UR: ABNORMAL /HPF
REF LAB TEST METHOD: ABNORMAL
SODIUM SERPL-SCNC: 134 MMOL/L (ref 136–145)
SP GR UR STRIP: >=1.03 (ref 1–1.03)
SQUAMOUS #/AREA URNS HPF: ABNORMAL /HPF
UROBILINOGEN UR QL STRIP: ABNORMAL
WBC # BLD AUTO: 18.12 10*3/MM3 (ref 3.4–10.8)
WBC UR QL AUTO: ABNORMAL /HPF

## 2020-12-04 PROCEDURE — 80053 COMPREHEN METABOLIC PANEL: CPT | Performed by: NURSE PRACTITIONER

## 2020-12-04 PROCEDURE — 85027 COMPLETE CBC AUTOMATED: CPT | Performed by: NURSE PRACTITIONER

## 2020-12-04 PROCEDURE — 99222 1ST HOSP IP/OBS MODERATE 55: CPT | Performed by: SURGERY

## 2020-12-04 PROCEDURE — 84156 ASSAY OF PROTEIN URINE: CPT | Performed by: HOSPITALIST

## 2020-12-04 PROCEDURE — 74183 MRI ABD W/O CNTR FLWD CNTR: CPT

## 2020-12-04 PROCEDURE — 25010000002 LEVOFLOXACIN PER 250 MG: Performed by: NURSE PRACTITIONER

## 2020-12-04 PROCEDURE — 25010000002 ENOXAPARIN PER 10 MG: Performed by: INTERNAL MEDICINE

## 2020-12-04 PROCEDURE — 0 GADOBENATE DIMEGLUMINE 529 MG/ML SOLUTION: Performed by: HOSPITALIST

## 2020-12-04 PROCEDURE — A9577 INJ MULTIHANCE: HCPCS | Performed by: HOSPITALIST

## 2020-12-04 PROCEDURE — 83690 ASSAY OF LIPASE: CPT | Performed by: NURSE PRACTITIONER

## 2020-12-04 PROCEDURE — 81001 URINALYSIS AUTO W/SCOPE: CPT | Performed by: HOSPITALIST

## 2020-12-04 PROCEDURE — 85610 PROTHROMBIN TIME: CPT | Performed by: NURSE PRACTITIONER

## 2020-12-04 PROCEDURE — 83036 HEMOGLOBIN GLYCOSYLATED A1C: CPT | Performed by: INTERNAL MEDICINE

## 2020-12-04 PROCEDURE — 82570 ASSAY OF URINE CREATININE: CPT | Performed by: HOSPITALIST

## 2020-12-04 RX ORDER — LORAZEPAM 1 MG/1
1 TABLET ORAL ONCE
Status: COMPLETED | OUTPATIENT
Start: 2020-12-04 | End: 2020-12-04

## 2020-12-04 RX ADMIN — PANTOPRAZOLE SODIUM 40 MG: 40 INJECTION, POWDER, FOR SOLUTION INTRAVENOUS at 11:48

## 2020-12-04 RX ADMIN — SODIUM CHLORIDE 125 ML/HR: 9 INJECTION, SOLUTION INTRAVENOUS at 14:00

## 2020-12-04 RX ADMIN — GADOBENATE DIMEGLUMINE 20 ML: 529 INJECTION, SOLUTION INTRAVENOUS at 09:03

## 2020-12-04 RX ADMIN — LORAZEPAM 1 MG: 1 TABLET ORAL at 07:02

## 2020-12-04 RX ADMIN — LEVOFLOXACIN 500 MG: 5 INJECTION, SOLUTION INTRAVENOUS at 21:41

## 2020-12-04 RX ADMIN — ENOXAPARIN SODIUM 40 MG: 40 INJECTION SUBCUTANEOUS at 11:49

## 2020-12-04 NOTE — PLAN OF CARE
Goal Outcome Evaluation:  Plan of Care Reviewed With: patient  Progress: no change  Outcome Summary: VSS. Pt on clear liquid diet unitl midnight. NPO at 00:00 for Lap Deepika tomorrow after MRI today showed multiple gallstones. Pt resting well today, 24 hour urine in progress until 0300 on the 5th. wcm

## 2020-12-04 NOTE — CONSULTS
REASON FOR CONSULT:    Pancreatitis  Cholelithiasis    REQUESTING PHYSICIAN:    Stewart Rhoades M.D.    HISTORY OF PRESENT ILLNESS:    Pedro Gould is a 80 y.o. male who was admitted to the hospital with abdominal pain.  It began on Thanksgiving.  It was centrally located and dull, but intense.  He felt that it might be dyspepsia or acid reflux.  He took some over-the-counter antacids and an extra proton pump inhibitor without significant improvement.  Over the next several days the pain slowly improved, but never really went away.  There was no nausea or vomiting.  There was no diarrhea or constipation.  The pain worsens with meals.  It is not worse with physical activity or movement.  There are no urinary complaints.        Past Medical History:   Diagnosis Date   • Arthritis    • Arthritis of knee, left    • ED (erectile dysfunction)    • Erectile dysfunction    • Family history of diabetes mellitus     sister   • Family history of ischemic heart disease     father   • Family history of malignant neoplasm of gastrointestinal tract     mother   • GERD (gastroesophageal reflux disease)    • GERD (gastroesophageal reflux disease)    • History of colonoscopy     never   • Irritant dermatitis     forearms   • Nocturia    • Rotator cuff syndrome    • Screening for prostate cancer     unknown   • Sensory neuropathy     bilateral foot       Past Surgical History:   Procedure Laterality Date   • EYE SURGERY  2014    laser   • JOINT REPLACEMENT Right 03/16/2010   • KNEE SURGERY  04/16/2010    ALSO 7/10 & 9/10; partial revision due to infection R knee; total removal of R knee hardware; reimplantation R knee   • REPLACEMENT TOTAL KNEE Left 09/2015         Current Facility-Administered Medications:   •  aluminum-magnesium hydroxide-simethicone (MAALOX MAX) 400-400-40 MG/5ML suspension 15 mL, 15 mL, Oral, Q6H PRN, Floyd Good, APRN  •  bisacodyl (DULCOLAX) EC tablet 5 mg, 5 mg, Oral, Daily PRN, Floyd Good  JAIME ANDERSON  •  bisacodyl (DULCOLAX) suppository 10 mg, 10 mg, Rectal, Daily PRN, Floyd Good APRN  •  enoxaparin (LOVENOX) syringe 40 mg, 40 mg, Subcutaneous, Q24H, Stewart Rhoades MD  •  [START ON 12/5/2020] influenza vac split quad (FLUZONE,FLUARIX,AFLURIA,FLULAVAL) injection 0.5 mL, 0.5 mL, Intramuscular, Once, Stewart Rhoades MD  •  levoFLOXacin (LEVAQUIN) 500 mg/100 mL D5W (premix) (LEVAQUIN) 500 mg, 500 mg, Intravenous, Q24H, Floyd Good APRN, 500 mg at 12/03/20 2331  •  nitroglycerin (NITROSTAT) SL tablet 0.4 mg, 0.4 mg, Sublingual, Q5 Min PRN, Floyd Good APRN  •  ondansetron (ZOFRAN) tablet 4 mg, 4 mg, Oral, Q6H PRN **OR** ondansetron (ZOFRAN) injection 4 mg, 4 mg, Intravenous, Q6H PRN, Floyd Good APRN  •  pantoprazole (PROTONIX) injection 40 mg, 40 mg, Intravenous, Q AM, Stewart Rhoades MD, 40 mg at 12/03/20 2331  •  sodium chloride 0.9 % flush 10 mL, 10 mL, Intravenous, PRN, Floyd Good APRN  •  sodium chloride 0.9 % infusion, 125 mL/hr, Intravenous, Continuous, Floyd Good APRN, Stopped at 12/04/20 0806    Allergies   Allergen Reactions   • Celebrex [Celecoxib] Itching   • Penicillins    • Percocet [Oxycodone-Acetaminophen] Hallucinations   • Hydrocodone Rash       Family History   Problem Relation Age of Onset   • Cancer Mother 55        colon   • Alcohol abuse Father    • Heart disease Father    • Heart attack Father    • Diabetes Other         aunt       Social History     Socioeconomic History   • Marital status:      Spouse name: Not on file   • Number of children: Not on file   • Years of education: Not on file   • Highest education level: Not on file   Tobacco Use   • Smoking status: Former Smoker   • Smokeless tobacco: Never Used   • Tobacco comment: PIPE   Substance and Sexual Activity   • Alcohol use: No     Comment: occasional   • Drug use: No   • Sexual activity: Defer       Review of Systems   Constitutional: Negative for  "fever.   HENT: Negative for trouble swallowing.    Eyes: Negative for visual disturbance.   Respiratory: Negative for shortness of breath.    Cardiovascular: Negative for chest pain.   Gastrointestinal: Positive for abdominal pain. Negative for constipation, diarrhea, nausea and vomiting.   Genitourinary: Negative for dysuria.   Musculoskeletal: Negative for arthralgias.   Neurological: Negative for syncope.   Psychiatric/Behavioral: Negative for confusion.       Objective     /82 (BP Location: Right arm, Patient Position: Lying)   Pulse 95   Temp 100.4 °F (38 °C) (Oral)   Resp 18   Ht 180.3 cm (71\")   Wt 110 kg (243 lb)   SpO2 93%   BMI 33.89 kg/m²     Physical Exam  Vitals signs reviewed.   Constitutional:       General: He is not in acute distress.     Appearance: He is well-developed. He is not diaphoretic.   HENT:      Head: Normocephalic and atraumatic.   Eyes:      General: No scleral icterus.     Conjunctiva/sclera: Conjunctivae normal.   Neck:      Musculoskeletal: Neck supple.      Trachea: No tracheal deviation.   Cardiovascular:      Rate and Rhythm: Normal rate and regular rhythm.      Heart sounds: Normal heart sounds. No murmur.   Pulmonary:      Effort: Pulmonary effort is normal. No respiratory distress.      Breath sounds: Normal breath sounds. No wheezing or rales.   Abdominal:      General: Bowel sounds are normal. There is no distension.      Palpations: Abdomen is soft.      Tenderness: There is abdominal tenderness (minimal centrally located tenderness).      Hernia: No hernia is present.   Musculoskeletal:         General: No deformity.   Skin:     General: Skin is warm and dry.   Neurological:      Mental Status: He is alert and oriented to person, place, and time.   Psychiatric:         Mood and Affect: Mood normal.         Behavior: Behavior normal.         DIAGNOSTIC DATA:    WBC 19.74 --> 18.12, Hgb 13.3, platelets 226.  Creatinine 0.91.  AlkP 90, , AST 29, T bili " 2.0.  Lipase 182--> 84    I reviewed the images and the report of CT scan of the abdomen and pelvis performed on 12/3/2020.  It shows cholelithiasis without gallbladder wall thickening or pericholecystic fluid.  There are peripancreatic inflammatory changes.  There is a small cystic lesion in the head of the pancreas.    I reviewed the images of an ultrasound of the right upper quadrant.  Report is not yet available.  There are multiple gallstones.  The gallbladder wall is minimally thickened at 3 mm.  Common bile duct is normal caliber at 6 mm.  There is a trace amount of pericholecystic fluid.    MRI has been completed.  The full complement of images and report are not yet available.    ASSESSMENT:    Cholelithiasis  Mild acute pancreatitis-likely secondary to gallstones  Perhaps also cholecystitis based on the ultrasound images    PLAN:    I will review the MRI when it is available.  He has no specific risk factors for pancreatitis other than gallstones.  He has leukocytosis and the images on ultrasound are suspicious for cholecystitis.  He is on levofloxacin which should provide good coverage of biliary tract organisms.  He and I discussed laparoscopic cholecystectomy to prevent further episodes of pancreatitis.  I will arrange for surgery on 12/5/2020.

## 2020-12-04 NOTE — ED NOTES
PPE per protocol, eye protection, mask, and gloves worn, pt in mask,        Vandana Reyes RN  12/03/20 [id number]

## 2020-12-04 NOTE — PROGRESS NOTES
Name: Pedro Gould ADMIT: 12/3/2020   : 1940  PCP: Hesham Delacruz MD    MRN: [id number] LOS: 1 days   AGE/SEX: 80 y.o. male  ROOM: [facility]     Subjective   Subjective   Feels some better but still has some abdominal pain.  Denies any chest pain or difficulty breathing.    Review of Systems     Objective   Objective   Vital Signs  Temp:  [98.3 °F (36.8 °C)-100.4 °F (38 °C)] 98.3 °F (36.8 °C)  Heart Rate:  [] 87  Resp:  [18] 18  BP: (121-154)/(70-94) 146/88  SpO2:  [92 %-96 %] 94 %  on   ;   Device (Oxygen Therapy): room air  Body mass index is 33.89 kg/m².  Physical Exam  Vitals signs and nursing note reviewed.   Constitutional:       General: He is not in acute distress.     Appearance: Normal appearance. He is well-developed.   Neck:      Vascular: No JVD.      Trachea: No tracheal deviation.   Cardiovascular:      Rate and Rhythm: Normal rate and regular rhythm.      Heart sounds: No murmur.   Pulmonary:      Effort: Pulmonary effort is normal. No respiratory distress.      Breath sounds: Normal breath sounds.   Abdominal:      General: Bowel sounds are normal. There is no distension.      Palpations: Abdomen is soft.      Tenderness: There is abdominal tenderness.   Skin:     General: Skin is warm and dry.   Neurological:      Mental Status: He is alert and oriented to person, place, and time.   Psychiatric:         Behavior: Behavior normal. Behavior is cooperative.         Results Review     I reviewed the patient's new clinical results.  Results from last 7 days   Lab Units 20  0618 20  1601   WBC 10*3/mm3 18.12* 19.74*   HEMOGLOBIN g/dL 13.3 15.5   PLATELETS 10*3/mm3 226 247     Results from last 7 days   Lab Units 20  0618 20  1601   SODIUM mmol/L 134* 131*   POTASSIUM mmol/L 3.8 4.3   CHLORIDE mmol/L 99 95*   CO2 mmol/L 24.3 26.2   BUN mg/dL 14 14   CREATININE mg/dL 0.91 1.01   GLUCOSE mg/dL 131* 128*   Estimated Creatinine Clearance: 84.4 mL/min (by C-G formula  based on SCr of 0.91 mg/dL).  Results from last 7 days   Lab Units 12/04/20  0618 12/03/20  1601   ALBUMIN g/dL 3.70 4.20   BILIRUBIN mg/dL 2.0* 2.7*   ALK PHOS U/L 90 110   AST (SGOT) U/L 29 40   ALT (SGPT) U/L 103* 161*     Results from last 7 days   Lab Units 12/04/20  0618 12/03/20  1601   CALCIUM mg/dL 8.5* 9.1   ALBUMIN g/dL 3.70 4.20     Results from last 7 days   Lab Units 12/03/20  1640   PROCALCITONIN ng/mL 0.22     COVID19   Date Value Ref Range Status   12/03/2020 Not Detected Not Detected - Ref. Range Final     Hemoglobin A1C   Date/Time Value Ref Range Status   12/04/2020 0618 6.20 (H) 4.80 - 5.60 % Final       MRI abdomen w wo contrast mrcp  MRI ABDOMEN WITH AND WITHOUT CONTRAST     HISTORY: Pancreatitis; lipase as high as 182 and trending down and now  at 84.     TECHNIQUE: Multiplanar, multisequence MRI of the abdomen was performed  before and after the IV administration of 20 mL of MultiHance.     COMPARISON: CT abdomen and pelvis 12/03/2020.     FINDINGS:  Moderate to severe hepatic steatosis is present. Colonic diverticulosis  is present within the visualized abdomen. There is a moderate hiatal  hernia.     The gallbladder is at the upper limits of normal to mildly distended and  is filled with gallstones. There is no intra or extrahepatic biliary  ductal dilation. No focal suspicious hepatic lesion is visualized. The  portal and hepatic veins are patent. The splenic vein and artery are  also patent.     There is a 1.2 cm nonenhancing cystic lesion within the pancreatic body.  There is a 0.6 cm cystic lesion within the pancreatic head which appears  to directly abut the main pancreatic duct. There are also a few tiny  focal T2 hyperintensities within the pancreatic tail.  The main  pancreatic duct is nondistended. Peripancreatic ill-defined T2  hyperintense stranding and fluid are present. There is no large segment  of nonenhancement or T1 hyperintensity on precontrast imaging; however,  the most  inferior aspect of the pancreatic head is collimated out of the  field-of-view on early postcontrast imaging. Evaluation of this area is  incomplete. There is an area of T2 hyperintensity within the anterior  superior aspect of the pancreatic head which corresponds with an area of  fat density on CT and also demonstrates signal dropout on fat-saturated  T1-weighted weighted imaging and Luanne Ink artifact on out-of-phase  imaging indicating the presence of macroscopic fat.     IMPRESSION  1.  Findings of acute pancreatitis. No visualized findings of pancreatic  hemorrhage or necrosis. Area of insinuating fat is present within the  pancreatic head as detailed above.  2.  A 1.1 cm hypodense lesion within the pancreatic body while favored  to represent a primary pancreatic cystic lesion, an acute loculated  peripancreatic fluid collection cannot be excluded and therefore  continued attention on follow-up with MRI abdomen in 6 weeks is  recommended to ensure stability and exclude this possibility. The  smaller T2 hyperintense lesions within the pancreatic head and tail  while favored to represent dilated side branches should also be followed  up at this time as well.  3.  Gallbladder is borderline distended and filled with gallstones.  There are no findings of choledocholithiasis. Underlying early  cholecystitis cannot be excluded and correlation with patient history is  recommended with follow-up HIDA scan if clinically indicated.  4.  Hepatic steatosis.  5.  Other findings as above.          Scheduled Medications  enoxaparin, 40 mg, Subcutaneous, Q24H  [START ON 12/5/2020] influenza vaccine, 0.5 mL, Intramuscular, Once  levoFLOXacin, 500 mg, Intravenous, Q24H  pantoprazole, 40 mg, Intravenous, Q AM    Infusions  sodium chloride, 125 mL/hr, Last Rate: 125 mL/hr (12/04/20 1400)    Diet  NPO Diet  Diet Clear Liquid       Assessment/Plan     Active Hospital Problems    Diagnosis  POA   • **Acute biliary pancreatitis  without infection or necrosis [K85.10]  Yes   • Obesity (BMI 30-39.9) [E66.9]  Yes   • Proteinuria [R80.9]  Yes   • Leukocytosis [D72.829]  Yes   • Hyponatremia [E87.1]  Yes   • Calculus of gallbladder with cholecystitis without biliary obstruction [K80.10]  Yes   • GERD (gastroesophageal reflux disease) [K21.9]  Yes      Resolved Hospital Problems   No resolved problems to display.       80 y.o. male admitted with Acute biliary pancreatitis without infection or necrosis.    CT scan reviewed.  Findings suggest acute pancreatitis felt to be gallbladder in origin.  There was an incidental 1.1 cm pancreatic lesion recommend MRI in 6 weeks.  General surgery has seen him and is planning laparoscopic cholecystectomy tomorrow.  Continue current antibiotics.  24-hour urine ordered by admitting regarding proteinuria.  Interesting he had 2+ blood as well.  No RBC and no casts.  Will check CPK.  Repeat UA now that he has been hydrated.  Renal function is normal.      · Lovenox 40 mg SC daily for DVT prophylaxis.  · Full code.  · Discussed with patient and care team on multidisciplinary rounds.  · Anticipate discharge home in 2-3 days.      Cleve Camarena MD  [facility]  12/04/20  15:11 EST

## 2020-12-04 NOTE — PLAN OF CARE
Goal Outcome Evaluation:  Plan of Care Reviewed With: patient  Progress: improving  Outcome Summary: No complaints voiced, denies pain, NPO VSS, NSR on monitor, eyes closed at very short interval, will continue to monitor

## 2020-12-04 NOTE — H&P
"    Patient Name:  Pedro Gould  YOB: 1940  MRN:  [id number]  Admit Date:  12/3/2020  Patient Care Team:  Hesham Delacruz MD as PCP - General (Family Medicine)  Hesham Delacruz MD as PCP - Family Medicine  Hesham Delacruz MD Wright, James, MD      Subjective   History Present Illness     Chief Complaint   Patient presents with   • Chest Pain     states he thinks it indigestion      History of Present Illness   Mr. Gould is a 80 y.o. former smoker with a history of GERD that presents to [facility] complaining of a \"knot in his upper abdomen\".  When asked what brings him to the hospital, he told me he has explained this to many times already and I should look at the information already given in his chart.  Due to patient's agitation, HPI has been taken from ER documentation.  Patient reports he noticed a \"knot\" in his epigastric area on Thanksgiving afternoon after he ate pumpkin pie and drank some strong coffee.  He took some Tums and Nexium the following day which alleviated his symptoms.  His symptoms recurred on Tuesday and at this juncture he took another Nexium and Tums which essentially alleviated his symptoms.  The patient called his primary care provider who advised him come to the hospital for further evaluation.     CT abdominal pelvis shows acute pancreatitis with peripancreatic inflammation, duodenitis,11 mm low-density lesion within the pancreatic body, and cholelithiasis.  No gallbladder wall thickening and no biliary duct dilation noted to suggest gallstone pancreatitis.  Labs obtained show a glucose of 128, sodium 131, anion gap 95, , total bili 2.7, lipase 182, pro-Ned 0.22, and WBC 19.74.  Patient denies any alcohol use.  He also denies chest pain, shortness of breath, fever, loss of taste smell, cough, and chills.    Review of Systems   Constitutional: Negative for chills and fever.   HENT: Negative for congestion and rhinorrhea.    Eyes: Negative for " photophobia and visual disturbance.   Respiratory: Negative for cough and shortness of breath.    Cardiovascular: Negative for chest pain and palpitations.   Gastrointestinal: Positive for abdominal pain and nausea. Negative for constipation, diarrhea and vomiting.   Endocrine: Negative for cold intolerance and heat intolerance.   Genitourinary: Negative for difficulty urinating and dysuria.   Musculoskeletal: Negative for gait problem and joint swelling.   Skin: Negative for rash and wound.   Neurological: Negative for dizziness, light-headedness and headaches.   Psychiatric/Behavioral: Negative for sleep disturbance and suicidal ideas.        Personal History     Past Medical History:   Diagnosis Date   • Arthritis    • Arthritis of knee, left    • ED (erectile dysfunction)    • Erectile dysfunction    • Family history of diabetes mellitus     sister   • Family history of ischemic heart disease     father   • Family history of malignant neoplasm of gastrointestinal tract     mother   • GERD (gastroesophageal reflux disease)    • GERD (gastroesophageal reflux disease)    • History of colonoscopy     never   • Irritant dermatitis     forearms   • Nocturia    • Rotator cuff syndrome    • Screening for prostate cancer     unknown   • Sensory neuropathy     bilateral foot     Past Surgical History:   Procedure Laterality Date   • EYE SURGERY  2014    laser   • JOINT REPLACEMENT Right 03/16/2010   • KNEE SURGERY  04/16/2010    ALSO 7/10 & 9/10; partial revision due to infection R knee; total removal of R knee hardware; reimplantation R knee   • REPLACEMENT TOTAL KNEE Left 09/2015     Family History   Problem Relation Age of Onset   • Cancer Mother 55        colon   • Alcohol abuse Father    • Heart disease Father    • Heart attack Father    • Diabetes Other         aunt     Social History     Tobacco Use   • Smoking status: Former Smoker   • Smokeless tobacco: Never Used   • Tobacco comment: PIPE   Substance Use Topics    • Alcohol use: No     Comment: occasional   • Drug use: No     No current facility-administered medications on file prior to encounter.      Current Outpatient Medications on File Prior to Encounter   Medication Sig Dispense Refill   • Esomeprazole Magnesium (NEXIUM 24HR PO) Take 1 capsule by mouth As Needed.       Allergies   Allergen Reactions   • Celebrex [Celecoxib] Itching   • Penicillins    • Percocet [Oxycodone-Acetaminophen] Hallucinations   • Hydrocodone Rash       Objective    Objective     Vital Signs  Temp:  [98.8 °F (37.1 °C)] 98.8 °F (37.1 °C)  Heart Rate:  [] 94  Resp:  [18] 18  BP: (121-154)/(70-91) 123/70  SpO2:  [92 %-96 %] 92 %  on   ;   Device (Oxygen Therapy): room air  Body mass index is 32.78 kg/m².    Physical Exam  Vitals signs and nursing note reviewed.   Constitutional:       General: He is not in acute distress.     Appearance: He is well-developed. He is obese. He is not toxic-appearing.   HENT:      Head: Normocephalic and atraumatic.   Eyes:      General: No scleral icterus.        Right eye: No discharge.         Left eye: No discharge.      Conjunctiva/sclera: Conjunctivae normal.   Neck:      Musculoskeletal: Normal range of motion and neck supple.      Vascular: No JVD.   Cardiovascular:      Rate and Rhythm: Regular rhythm. Tachycardia present.      Heart sounds: Normal heart sounds. No murmur. No friction rub. No gallop.    Pulmonary:      Effort: Pulmonary effort is normal. No respiratory distress.      Breath sounds: Normal breath sounds. No wheezing or rales.   Abdominal:      General: Bowel sounds are normal. There is no distension.      Palpations: Abdomen is soft.      Tenderness: There is no abdominal tenderness. There is no guarding or rebound.   Musculoskeletal: Normal range of motion.         General: No tenderness or deformity.   Skin:     General: Skin is warm and dry.      Capillary Refill: Capillary refill takes less than 2 seconds.   Neurological:       Mental Status: He is alert and oriented to person, place, and time.   Psychiatric:         Mood and Affect: Affect is angry.         Behavior: Behavior normal.       Results Review:  I reviewed the patient's new clinical results.    Lab Results (last 24 hours)     Procedure Component Value Units Date/Time    CBC & Differential [[id number]]  (Abnormal) Collected: 12/03/20 1601    Specimen: Blood Updated: 12/03/20 1644    Narrative:      The following orders were created for panel order CBC & Differential.  Procedure                               Abnormality         Status                     ---------                               -----------         ------                     CBC Auto Differential[[id number]]        Abnormal            Final result                 Please view results for these tests on the individual orders.    Comprehensive Metabolic Panel [[id number]]  (Abnormal) Collected: 12/03/20 1601    Specimen: Blood Updated: 12/03/20 1710     Glucose 128 mg/dL      BUN 14 mg/dL      Creatinine 1.01 mg/dL      Sodium 131 mmol/L      Potassium 4.3 mmol/L      Chloride 95 mmol/L      CO2 26.2 mmol/L      Calcium 9.1 mg/dL      Total Protein 7.1 g/dL      Albumin 4.20 g/dL      ALT (SGPT) 161 U/L      AST (SGOT) 40 U/L      Alkaline Phosphatase 110 U/L      Total Bilirubin 2.7 mg/dL      eGFR Non African Amer 71 mL/min/1.73      Globulin 2.9 gm/dL      A/G Ratio 1.4 g/dL      BUN/Creatinine Ratio 13.9     Anion Gap 9.8 mmol/L     Narrative:      GFR Normal >60  Chronic Kidney Disease <60  Kidney Failure <15      Troponin [[id number]]  (Normal) Collected: 12/03/20 1601    Specimen: Blood Updated: 12/03/20 1710     Troponin T <0.010 ng/mL     Narrative:      Troponin T Reference Range:  <= 0.03 ng/mL-   Negative for AMI  >0.03 ng/mL-     Abnormal for myocardial necrosis.  Clinicians would have to utilize clinical acumen, EKG, Troponin and serial changes to determine if it is an Acute Myocardial Infarction or  myocardial injury due to an underlying chronic condition.       Results may be falsely decreased if patient taking Biotin.      CBC Auto Differential [[id number]]  (Abnormal) Collected: 12/03/20 1601    Specimen: Blood Updated: 12/03/20 1644     WBC 19.74 10*3/mm3      RBC 5.14 10*6/mm3      Hemoglobin 15.5 g/dL      Hematocrit 46.5 %      MCV 90.5 fL      MCH 30.2 pg      MCHC 33.3 g/dL      RDW 12.4 %      RDW-SD 41.3 fl      MPV 10.7 fL      Platelets 247 10*3/mm3      Neutrophil % 88.0 %      Lymphocyte % 3.0 %      Monocyte % 7.4 %      Eosinophil % 0.3 %      Basophil % 0.3 %      Immature Grans % 1.0 %      Neutrophils, Absolute 17.39 10*3/mm3      Lymphocytes, Absolute 0.59 10*3/mm3      Monocytes, Absolute 1.46 10*3/mm3      Eosinophils, Absolute 0.05 10*3/mm3      Basophils, Absolute 0.05 10*3/mm3      Immature Grans, Absolute 0.20 10*3/mm3      nRBC 0.0 /100 WBC     BNP [[id number]]  (Normal) Collected: 12/03/20 1601    Specimen: Blood Updated: 12/03/20 1707     proBNP 176.6 pg/mL     Narrative:      Among patients with dyspnea, NT-proBNP is highly sensitive for the detection of acute congestive heart failure. In addition NT-proBNP of <300 pg/ml effectively rules out acute congestive heart failure with 99% negative predictive value.    Results may be falsely decreased if patient taking Biotin.      Troponin [[id number]]  (Normal) Collected: 12/03/20 1640    Specimen: Blood Updated: 12/03/20 1717     Troponin T <0.010 ng/mL     Narrative:      Troponin T Reference Range:  <= 0.03 ng/mL-   Negative for AMI  >0.03 ng/mL-     Abnormal for myocardial necrosis.  Clinicians would have to utilize clinical acumen, EKG, Troponin and serial changes to determine if it is an Acute Myocardial Infarction or myocardial injury due to an underlying chronic condition.       Results may be falsely decreased if patient taking Biotin.      Lipase [[id number]]  (Abnormal) Collected: 12/03/20 1640    Specimen: Blood Updated:  "12/03/20 1733     Lipase 182 U/L     Procalcitonin [[id number]]  (Normal) Collected: 12/03/20 1640    Specimen: Blood Updated: 12/03/20 1747     Procalcitonin 0.22 ng/mL     Narrative:      As a Marker for Sepsis (Non-Neonates):   1. <0.5 ng/mL represents a low risk of severe sepsis and/or septic shock.  1. >2 ng/mL represents a high risk of severe sepsis and/or septic shock.    As a Marker for Lower Respiratory Tract Infections that require antibiotic therapy:  PCT on Admission     Antibiotic Therapy             6-12 Hrs later  > 0.5                Strongly Recommended            >0.25 - <0.5         Recommended  0.1 - 0.25           Discouraged                   Remeasure/reassess PCT  <0.1                 Strongly Discouraged          Remeasure/reassess PCT      As 28 day mortality risk marker: \"Change in Procalcitonin Result\" (> 80 % or <=80 %) if Day 0 (or Day 1) and Day 4 values are available. Refer to http://[email or web address]ct-calculator.com/   Change in PCT <=80 %   A decrease of PCT levels below or equal to 80 % defines a positive change in PCT test result representing a higher risk for 28-day all-cause mortality of patients diagnosed with severe sepsis or septic shock.  Change in PCT > 80 %   A decrease of PCT levels of more than 80 % defines a negative change in PCT result representing a lower risk for 28-day all-cause mortality of patients diagnosed with severe sepsis or septic shock.                Results may be falsely decreased if patient taking Biotin.     Urinalysis With Microscopic If Indicated (No Culture) - Urine, Clean Catch [[id number]]  (Abnormal) Collected: 12/03/20 1732    Specimen: Urine, Clean Catch Updated: 12/03/20 1743     Color, UA Orange     Comment: Any Substance that causes an abnormal urine color can alter the accuracy of the chemical reactions.        Appearance, UA Clear     pH, UA 5.5     Specific Gravity, UA >=1.030     Glucose, UA Negative     Ketones, UA 80 mg/dL (3+)     " Bilirubin, UA Negative     Blood, UA Moderate (2+)     Protein, UA >=300 mg/dL (3+)     Leuk Esterase, UA Negative     Nitrite, UA Negative     Urobilinogen, UA 1.0 E.U./dL    Urinalysis, Microscopic Only - Urine, Clean Catch [[id number]]  (Abnormal) Collected: 12/03/20 1732    Specimen: Urine, Clean Catch Updated: 12/03/20 1757     RBC, UA 3-5 /HPF      WBC, UA 0-2 /HPF      Bacteria, UA None Seen /HPF      Squamous Epithelial Cells, UA 0-2 /HPF      Hyaline Casts, UA None Seen /LPF      Methodology Manual Light Microscopy    Respiratory Panel PCR w/COVID-19(SARS-CoV-2) DAVID/SALVADOR/JENNIFER/PAD/COR/MAD/PAKO In-House, NP Swab in UTM/VTM, 3-4 HR TAT - Swab, Nasopharynx [[id number]]  (Normal) Collected: 12/03/20 1936    Specimen: Swab from Nasopharynx Updated: 12/03/20 2037     ADENOVIRUS, PCR Not Detected     Coronavirus 229E Not Detected     Coronavirus HKU1 Not Detected     Coronavirus NL63 Not Detected     Coronavirus OC43 Not Detected     COVID19 Not Detected     Human Metapneumovirus Not Detected     Human Rhinovirus/Enterovirus Not Detected     Influenza A PCR Not Detected     Influenza B PCR Not Detected     Parainfluenza Virus 1 Not Detected     Parainfluenza Virus 2 Not Detected     Parainfluenza Virus 3 Not Detected     Parainfluenza Virus 4 Not Detected     RSV, PCR Not Detected     Bordetella pertussis pcr Not Detected     Bordetella parapertussis PCR Not Detected     Chlamydophila pneumoniae PCR Not Detected     Mycoplasma pneumo by PCR Not Detected    Narrative:      Fact sheet for providers: https://docs.Jamn/wp-content/uploads/ETQ4702-7923-IF3.1-EUA-Provider-Fact-Sheet-3.pdf    Fact sheet for patients: https://docs.Jam[employer]/wp-content/uploads/TTC7744-6595-LD9.1-EUA-Patient-Fact-Sheet-1.pdf          Imaging Results (Last 24 Hours)     Procedure Component Value Units Date/Time    CT Abdomen Pelvis With Contrast [[id number]] Collected: 12/03/20 1833     Updated: 12/03/20 1846    Narrative:      CT  ABDOMEN AND PELVIS WITH IV CONTRAST     HISTORY: Epigastric pain. Pancreatitis.     TECHNIQUE: CT includes axial imaging from the lung bases to the  trochanters with IV contrast.     COMPARISON: None     FINDINGS: There is dependent lower lobe atelectasis. A small-to-moderate  hiatal hernia is present. There is diffuse fat infiltration of the  liver. Multiple gallstones fill the gallbladder without gallbladder  distention or pericholecystic inflammation. The splenic size is normal.  Adrenal glands appear normal. There is abnormal peripancreatic stranding  that is consistent with pancreatitis. There is no evidence for splenic  vein thrombosis. Within the central upper anterior pancreatic body there  is a pancreatic low density lesion measuring 1.1 cm. Wall thickening is  present in the descending portion of the duodenum most likely related to  secondary duodenitis. Small bilateral renal cysts are present. There is  no hydronephrosis. There is no bowel dilatation or evidence for bowel  obstruction. Sigmoid diverticulosis is present without evidence for  diverticulitis.       Impression:      1. Acute pancreatitis with peripancreatic inflammation. Descending  duodenal wall thickening most likely due to secondary duodenitis.  2. 11 mm low-density lesion within the pancreatic body. Current ACR  recommendations for management of a less than 1.5 cm incidental  pancreatic cyst in a patient of 65-79 years at presentation is to  reimage every 2 years x 5 and stop if stable over 10 years.  3. Cholelithiasis. Gallbladder wall is not thickened and there is no  biliary duct dilatation to suggest gallstone pancreatitis.  4. Small hiatal hernia. Bibasilar atelectasis.     Radiation dose reduction techniques were utilized, including automated  exposure control and exposure modulation based on body size.     This report was finalized on 12/3/2020 6:43 PM by Dr. Hema Lynne M.D.       XR Chest 1 View [[id number]] Collected:  12/03/20 1722     Updated: 12/03/20 1743    Narrative:      CHEST SINGLE VIEW     HISTORY: Mid chest pain. Hiatal hernia.     COMPARISON: None     FINDINGS: Heart size is borderline enlarged. Aortic vascular  calcifications are present. The lungs appear clear and there is no  evidence for pulmonary edema or pleural effusion or infiltrate. Cardiac  monitor and leads are noted.       Impression:      No evidence for active disease in the chest.     This report was finalized on 12/3/2020 5:40 PM by Dr. Hema Lynne M.D.                  ECG 12 Lead   Final Result   HEART RATE= 103  bpm   RR Interval= 580  ms   NV Interval= 176  ms   P Horizontal Axis= 1  deg   P Front Axis= 53  deg   QRSD Interval= 148  ms   QT Interval= 349  ms   QRS Axis= -93  deg   T Wave Axis= -10  deg   - ABNORMAL ECG -   Sinus tachycardia   RBBB and LAFB   NO PRIOR TRACING AVAILABLE FOR COMPARISON   Electronically Signed By: Abbey Sutton ([facility]) 03-Dec-2020 17:01:11   Date and Time of Study: 2020-12-03 14:32:37           Assessment/Plan     Active Hospital Problems    Diagnosis  POA   • **Acute pancreatitis [K85.90]  Yes   • Obesity (BMI 30-39.9) [E66.9]  Yes   • GERD (gastroesophageal reflux disease) [K21.9]  Yes      Resolved Hospital Problems   No resolved problems to display.       Mr. Gould is a 80 y.o. former smoker with a history of GERD who presents with epigastric pain secondary to acute pancreatitis.    Acute pancreatitis   -CT abdominal pelvis shows acute pancreatitis with peripancreatic inflammation, duodenitis,11 mm low-density lesion within the pancreatic body, and cholelithiasis.  No gallbladder wall thickening and no biliary duct dilation noted to suggest gallstone pancreatitis.  -Will check gallbladder ultrasound  -WBC 19.74-start Levaquin 500 mg IV daily  -Blood cultures x2  -IV hydration overnight  -N.p.o. diet  -IV Pepcid twice daily  -Check CBC, CMP, lipase, PT/INR in a.m.    GERD  -IV Pepcid to be given twice  daily      I discussed the patient's findings and my recommendations with patient and Dr. Villanueva.    VTE Prophylaxis - SCDs.  Code Status - Full code.       JAIME Eugene  [facility]  12/03/20  20:46 EST

## 2020-12-04 NOTE — PROGRESS NOTES
Discharge Planning Assessment  [facility]     Patient Name: Pedro Gould  MRN: [id number]  Today's Date: 12/4/2020    Admit Date: 12/3/2020    Discharge Needs Assessment     Row Name 12/04/20 0948       Living Environment    Lives With  spouse    Current Living Arrangements  home/apartment/condo    Primary Care Provided by  self    Provides Primary Care For  no one    Family Caregiver if Needed  spouse    Quality of Family Relationships  helpful;involved       Resource/Environmental Concerns    Resource/Environmental Concerns  none       Transition Planning    Patient/Family Anticipates Transition to  home with family    Patient/Family Anticipated Services at Transition  none    Transportation Anticipated  family or friend will provide       Discharge Needs Assessment    Readmission Within the Last 30 Days  no previous admission in last 30 days    Equipment Currently Used at Home  none    Concerns to be Addressed  no discharge needs identified;denies needs/concerns at this time    Equipment Needed After Discharge  none    Provided Post Acute Provider List?  Refused    Refused Provider List Comment  declined    Discharge Coordination/Progress  home no needs        Discharge Plan     Row Name 12/04/20 0949       Plan    Plan  home with family; denies needs    Patient/Family in Agreement with Plan  yes    Plan Comments  Checked IMM. Spoke with pt via phone as he is in isolation. Verified Face sheet. Explained role of CCP. Pt reports he lives with wife on the third floor of [facility] and there is an elevator he uses. Pt is IADLs no DME used to ambulate. Pt reports no history of HH or SNF. Verified PCP and pharmacy, no issues. Pt plans home at d/c. Denies needs. Wife can transport home. CCP to follow…KYLAH Roman        Continued Care and Services - Admitted Since 12/3/2020    Coordination has not been started for this encounter.         Demographic Summary     Row Name 12/04/20 0948       General Information     Admission Type  inpatient    Arrived From  home    Required Notices Provided  Important Message from Medicare    Reason for Consult  discharge planning    Preferred Language  English     Used During This Interaction  no        Functional Status     Row Name 12/04/20 0948       Functional Status    Usual Activity Tolerance  good    Current Activity Tolerance  moderate       Functional Status, IADL    Medications  independent    Meal Preparation  independent    Housekeeping  independent    Laundry  independent    Shopping  independent       Mental Status Summary    Recent Changes in Mental Status/Cognitive Functioning  no changes        Psychosocial    No documentation.       Abuse/Neglect    No documentation.       Legal    No documentation.       Substance Abuse    No documentation.       Patient Forms    No documentation.           SINTIA Downey

## 2020-12-05 ENCOUNTER — ANESTHESIA (OUTPATIENT)
Dept: PERIOP | Facility: HOSPITAL | Age: 80
End: 2020-12-05

## 2020-12-05 ENCOUNTER — APPOINTMENT (OUTPATIENT)
Dept: GENERAL RADIOLOGY | Facility: HOSPITAL | Age: 80
End: 2020-12-05

## 2020-12-05 ENCOUNTER — ANESTHESIA EVENT (OUTPATIENT)
Dept: PERIOP | Facility: HOSPITAL | Age: 80
End: 2020-12-05

## 2020-12-05 LAB
ALBUMIN SERPL-MCNC: 3.3 G/DL (ref 3.5–5.2)
ALBUMIN/GLOB SERPL: 1.2 G/DL
ALP SERPL-CCNC: 88 U/L (ref 39–117)
ALT SERPL W P-5'-P-CCNC: 71 U/L (ref 1–41)
ANION GAP SERPL CALCULATED.3IONS-SCNC: 9.4 MMOL/L (ref 5–15)
AST SERPL-CCNC: 24 U/L (ref 1–40)
BILIRUB SERPL-MCNC: 1.1 MG/DL (ref 0–1.2)
BUN SERPL-MCNC: 10 MG/DL (ref 8–23)
BUN/CREAT SERPL: 12.8 (ref 7–25)
CALCIUM SPEC-SCNC: 8.2 MG/DL (ref 8.6–10.5)
CHLORIDE SERPL-SCNC: 98 MMOL/L (ref 98–107)
CK SERPL-CCNC: 327 U/L (ref 20–200)
CO2 SERPL-SCNC: 23.6 MMOL/L (ref 22–29)
CREAT SERPL-MCNC: 0.78 MG/DL (ref 0.76–1.27)
DEPRECATED RDW RBC AUTO: 38.9 FL (ref 37–54)
ERYTHROCYTE [DISTWIDTH] IN BLOOD BY AUTOMATED COUNT: 12.2 % (ref 12.3–15.4)
GFR SERPL CREATININE-BSD FRML MDRD: 96 ML/MIN/1.73
GLOBULIN UR ELPH-MCNC: 2.7 GM/DL
GLUCOSE SERPL-MCNC: 109 MG/DL (ref 65–99)
HCT VFR BLD AUTO: 37.4 % (ref 37.5–51)
HGB BLD-MCNC: 12.6 G/DL (ref 13–17.7)
MCH RBC QN AUTO: 29.5 PG (ref 26.6–33)
MCHC RBC AUTO-ENTMCNC: 33.7 G/DL (ref 31.5–35.7)
MCV RBC AUTO: 87.6 FL (ref 79–97)
PLATELET # BLD AUTO: 233 10*3/MM3 (ref 140–450)
PMV BLD AUTO: 11 FL (ref 6–12)
POTASSIUM SERPL-SCNC: 3.5 MMOL/L (ref 3.5–5.2)
PROT 24H UR-MRATE: 742 MG/24HOURS (ref 0–150)
PROT SERPL-MCNC: 6 G/DL (ref 6–8.5)
RBC # BLD AUTO: 4.27 10*6/MM3 (ref 4.14–5.8)
SODIUM SERPL-SCNC: 131 MMOL/L (ref 136–145)
WBC # BLD AUTO: 14.37 10*3/MM3 (ref 3.4–10.8)

## 2020-12-05 PROCEDURE — 25010000002 DEXAMETHASONE PER 1 MG: Performed by: NURSE ANESTHETIST, CERTIFIED REGISTERED

## 2020-12-05 PROCEDURE — 25010000002 NEOSTIGMINE PER 0.5 MG: Performed by: NURSE ANESTHETIST, CERTIFIED REGISTERED

## 2020-12-05 PROCEDURE — 81050 URINALYSIS VOLUME MEASURE: CPT | Performed by: INTERNAL MEDICINE

## 2020-12-05 PROCEDURE — 25010000002 PROPOFOL 10 MG/ML EMULSION: Performed by: NURSE ANESTHETIST, CERTIFIED REGISTERED

## 2020-12-05 PROCEDURE — 0FT44ZZ RESECTION OF GALLBLADDER, PERCUTANEOUS ENDOSCOPIC APPROACH: ICD-10-PCS | Performed by: SURGERY

## 2020-12-05 PROCEDURE — 0 IOTHALAMATE 60 % SOLUTION: Performed by: SURGERY

## 2020-12-05 PROCEDURE — 80053 COMPREHEN METABOLIC PANEL: CPT | Performed by: HOSPITALIST

## 2020-12-05 PROCEDURE — 25010000002 LEVOFLOXACIN PER 250 MG: Performed by: SURGERY

## 2020-12-05 PROCEDURE — 85027 COMPLETE CBC AUTOMATED: CPT | Performed by: HOSPITALIST

## 2020-12-05 PROCEDURE — 84156 ASSAY OF PROTEIN URINE: CPT | Performed by: INTERNAL MEDICINE

## 2020-12-05 PROCEDURE — 88304 TISSUE EXAM BY PATHOLOGIST: CPT | Performed by: SURGERY

## 2020-12-05 PROCEDURE — 47563 LAPARO CHOLECYSTECTOMY/GRAPH: CPT | Performed by: SURGERY

## 2020-12-05 PROCEDURE — BF101ZZ FLUOROSCOPY OF BILE DUCTS USING LOW OSMOLAR CONTRAST: ICD-10-PCS | Performed by: SURGERY

## 2020-12-05 PROCEDURE — 25010000002 FENTANYL CITRATE (PF) 100 MCG/2ML SOLUTION: Performed by: NURSE ANESTHETIST, CERTIFIED REGISTERED

## 2020-12-05 PROCEDURE — 25010000002 MIDAZOLAM PER 1 MG: Performed by: ANESTHESIOLOGY

## 2020-12-05 PROCEDURE — 74300 X-RAY BILE DUCTS/PANCREAS: CPT

## 2020-12-05 PROCEDURE — 25010000002 ONDANSETRON PER 1 MG: Performed by: NURSE ANESTHETIST, CERTIFIED REGISTERED

## 2020-12-05 PROCEDURE — 82550 ASSAY OF CK (CPK): CPT | Performed by: HOSPITALIST

## 2020-12-05 PROCEDURE — 47563 LAPARO CHOLECYSTECTOMY/GRAPH: CPT | Performed by: PHYSICIAN ASSISTANT

## 2020-12-05 DEVICE — SEAL HEMO SURG ARISTA/AH ABS/PWDR 3GM: Type: IMPLANTABLE DEVICE | Site: ABDOMEN | Status: FUNCTIONAL

## 2020-12-05 DEVICE — LIGAMAX 5 MM ENDOSCOPIC MULTIPLE CLIP APPLIER
Type: IMPLANTABLE DEVICE | Site: ABDOMEN | Status: FUNCTIONAL
Brand: LIGAMAX

## 2020-12-05 RX ORDER — SODIUM CHLORIDE 0.9 % (FLUSH) 0.9 %
3 SYRINGE (ML) INJECTION EVERY 12 HOURS SCHEDULED
Status: DISCONTINUED | OUTPATIENT
Start: 2020-12-05 | End: 2020-12-05 | Stop reason: HOSPADM

## 2020-12-05 RX ORDER — DEXAMETHASONE SODIUM PHOSPHATE 10 MG/ML
INJECTION INTRAMUSCULAR; INTRAVENOUS AS NEEDED
Status: DISCONTINUED | OUTPATIENT
Start: 2020-12-05 | End: 2020-12-05 | Stop reason: SURG

## 2020-12-05 RX ORDER — GLYCOPYRROLATE 0.2 MG/ML
INJECTION INTRAMUSCULAR; INTRAVENOUS AS NEEDED
Status: DISCONTINUED | OUTPATIENT
Start: 2020-12-05 | End: 2020-12-05 | Stop reason: SURG

## 2020-12-05 RX ORDER — ROCURONIUM BROMIDE 10 MG/ML
INJECTION, SOLUTION INTRAVENOUS AS NEEDED
Status: DISCONTINUED | OUTPATIENT
Start: 2020-12-05 | End: 2020-12-05 | Stop reason: SURG

## 2020-12-05 RX ORDER — FENTANYL CITRATE 50 UG/ML
50 INJECTION, SOLUTION INTRAMUSCULAR; INTRAVENOUS
Status: DISCONTINUED | OUTPATIENT
Start: 2020-12-05 | End: 2020-12-05 | Stop reason: HOSPADM

## 2020-12-05 RX ORDER — LIDOCAINE HYDROCHLORIDE 20 MG/ML
INJECTION, SOLUTION INFILTRATION; PERINEURAL AS NEEDED
Status: DISCONTINUED | OUTPATIENT
Start: 2020-12-05 | End: 2020-12-05 | Stop reason: SURG

## 2020-12-05 RX ORDER — FLUMAZENIL 0.1 MG/ML
0.2 INJECTION INTRAVENOUS AS NEEDED
Status: DISCONTINUED | OUTPATIENT
Start: 2020-12-05 | End: 2020-12-05 | Stop reason: HOSPADM

## 2020-12-05 RX ORDER — HYDROCODONE BITARTRATE AND ACETAMINOPHEN 5; 325 MG/1; MG/1
1 TABLET ORAL EVERY 4 HOURS PRN
Status: DISCONTINUED | OUTPATIENT
Start: 2020-12-05 | End: 2020-12-06 | Stop reason: HOSPADM

## 2020-12-05 RX ORDER — DIPHENHYDRAMINE HYDROCHLORIDE 50 MG/ML
12.5 INJECTION INTRAMUSCULAR; INTRAVENOUS
Status: DISCONTINUED | OUTPATIENT
Start: 2020-12-05 | End: 2020-12-05 | Stop reason: HOSPADM

## 2020-12-05 RX ORDER — SODIUM CHLORIDE 0.9 % (FLUSH) 0.9 %
3-10 SYRINGE (ML) INJECTION AS NEEDED
Status: DISCONTINUED | OUTPATIENT
Start: 2020-12-05 | End: 2020-12-05 | Stop reason: HOSPADM

## 2020-12-05 RX ORDER — NALOXONE HCL 0.4 MG/ML
0.2 VIAL (ML) INJECTION AS NEEDED
Status: DISCONTINUED | OUTPATIENT
Start: 2020-12-05 | End: 2020-12-05 | Stop reason: HOSPADM

## 2020-12-05 RX ORDER — ONDANSETRON 2 MG/ML
4 INJECTION INTRAMUSCULAR; INTRAVENOUS ONCE AS NEEDED
Status: DISCONTINUED | OUTPATIENT
Start: 2020-12-05 | End: 2020-12-05 | Stop reason: HOSPADM

## 2020-12-05 RX ORDER — MAGNESIUM HYDROXIDE 1200 MG/15ML
LIQUID ORAL AS NEEDED
Status: DISCONTINUED | OUTPATIENT
Start: 2020-12-05 | End: 2020-12-05 | Stop reason: HOSPADM

## 2020-12-05 RX ORDER — EPHEDRINE SULFATE 50 MG/ML
5 INJECTION, SOLUTION INTRAVENOUS ONCE AS NEEDED
Status: DISCONTINUED | OUTPATIENT
Start: 2020-12-05 | End: 2020-12-05 | Stop reason: HOSPADM

## 2020-12-05 RX ORDER — BUPIVACAINE HYDROCHLORIDE AND EPINEPHRINE 5; 5 MG/ML; UG/ML
INJECTION, SOLUTION PERINEURAL AS NEEDED
Status: DISCONTINUED | OUTPATIENT
Start: 2020-12-05 | End: 2020-12-05 | Stop reason: HOSPADM

## 2020-12-05 RX ORDER — FENTANYL CITRATE 50 UG/ML
INJECTION, SOLUTION INTRAMUSCULAR; INTRAVENOUS AS NEEDED
Status: DISCONTINUED | OUTPATIENT
Start: 2020-12-05 | End: 2020-12-05 | Stop reason: SURG

## 2020-12-05 RX ORDER — LIDOCAINE HYDROCHLORIDE 10 MG/ML
0.5 INJECTION, SOLUTION EPIDURAL; INFILTRATION; INTRACAUDAL; PERINEURAL ONCE AS NEEDED
Status: DISCONTINUED | OUTPATIENT
Start: 2020-12-05 | End: 2020-12-05 | Stop reason: HOSPADM

## 2020-12-05 RX ORDER — PROPOFOL 10 MG/ML
VIAL (ML) INTRAVENOUS AS NEEDED
Status: DISCONTINUED | OUTPATIENT
Start: 2020-12-05 | End: 2020-12-05 | Stop reason: SURG

## 2020-12-05 RX ORDER — MIDAZOLAM HYDROCHLORIDE 1 MG/ML
1 INJECTION INTRAMUSCULAR; INTRAVENOUS
Status: DISCONTINUED | OUTPATIENT
Start: 2020-12-05 | End: 2020-12-05 | Stop reason: HOSPADM

## 2020-12-05 RX ORDER — HYDROMORPHONE HYDROCHLORIDE 1 MG/ML
0.5 INJECTION, SOLUTION INTRAMUSCULAR; INTRAVENOUS; SUBCUTANEOUS
Status: DISCONTINUED | OUTPATIENT
Start: 2020-12-05 | End: 2020-12-06 | Stop reason: HOSPADM

## 2020-12-05 RX ORDER — ONDANSETRON 2 MG/ML
INJECTION INTRAMUSCULAR; INTRAVENOUS AS NEEDED
Status: DISCONTINUED | OUTPATIENT
Start: 2020-12-05 | End: 2020-12-05 | Stop reason: SURG

## 2020-12-05 RX ORDER — DIPHENHYDRAMINE HCL 25 MG
25 CAPSULE ORAL
Status: DISCONTINUED | OUTPATIENT
Start: 2020-12-05 | End: 2020-12-05 | Stop reason: HOSPADM

## 2020-12-05 RX ORDER — SODIUM CHLORIDE, SODIUM LACTATE, POTASSIUM CHLORIDE, CALCIUM CHLORIDE 600; 310; 30; 20 MG/100ML; MG/100ML; MG/100ML; MG/100ML
9 INJECTION, SOLUTION INTRAVENOUS CONTINUOUS
Status: DISCONTINUED | OUTPATIENT
Start: 2020-12-05 | End: 2020-12-06 | Stop reason: HOSPADM

## 2020-12-05 RX ORDER — HYDROMORPHONE HYDROCHLORIDE 1 MG/ML
0.5 INJECTION, SOLUTION INTRAMUSCULAR; INTRAVENOUS; SUBCUTANEOUS
Status: DISCONTINUED | OUTPATIENT
Start: 2020-12-05 | End: 2020-12-05 | Stop reason: HOSPADM

## 2020-12-05 RX ORDER — HYDROCODONE BITARTRATE AND ACETAMINOPHEN 10; 325 MG/1; MG/1
1 TABLET ORAL EVERY 6 HOURS PRN
Status: DISCONTINUED | OUTPATIENT
Start: 2020-12-05 | End: 2020-12-06 | Stop reason: HOSPADM

## 2020-12-05 RX ORDER — FAMOTIDINE 10 MG/ML
20 INJECTION, SOLUTION INTRAVENOUS ONCE
Status: COMPLETED | OUTPATIENT
Start: 2020-12-05 | End: 2020-12-05

## 2020-12-05 RX ADMIN — NEOSTIGMINE METHYLSULFATE 2 MG: 1 INJECTION INTRAMUSCULAR; INTRAVENOUS; SUBCUTANEOUS at 14:23

## 2020-12-05 RX ADMIN — SODIUM CHLORIDE, POTASSIUM CHLORIDE, SODIUM LACTATE AND CALCIUM CHLORIDE: 600; 310; 30; 20 INJECTION, SOLUTION INTRAVENOUS at 13:55

## 2020-12-05 RX ADMIN — SODIUM CHLORIDE, POTASSIUM CHLORIDE, SODIUM LACTATE AND CALCIUM CHLORIDE 9 ML/HR: 600; 310; 30; 20 INJECTION, SOLUTION INTRAVENOUS at 11:45

## 2020-12-05 RX ADMIN — PANTOPRAZOLE SODIUM 40 MG: 40 INJECTION, POWDER, FOR SOLUTION INTRAVENOUS at 06:55

## 2020-12-05 RX ADMIN — DEXAMETHASONE SODIUM PHOSPHATE 6 MG: 10 INJECTION INTRAMUSCULAR; INTRAVENOUS at 13:00

## 2020-12-05 RX ADMIN — SODIUM CHLORIDE, PRESERVATIVE FREE 10 ML: 5 INJECTION INTRAVENOUS at 20:52

## 2020-12-05 RX ADMIN — LEVOFLOXACIN 500 MG: 5 INJECTION, SOLUTION INTRAVENOUS at 20:50

## 2020-12-05 RX ADMIN — PROPOFOL 200 MG: 10 INJECTION, EMULSION INTRAVENOUS at 12:45

## 2020-12-05 RX ADMIN — FENTANYL CITRATE 100 MCG: 50 INJECTION INTRAMUSCULAR; INTRAVENOUS at 12:45

## 2020-12-05 RX ADMIN — FENTANYL CITRATE 50 MCG: 50 INJECTION INTRAMUSCULAR; INTRAVENOUS at 13:57

## 2020-12-05 RX ADMIN — GLYCOPYRROLATE 0.4 MG: 0.2 INJECTION INTRAMUSCULAR; INTRAVENOUS at 14:23

## 2020-12-05 RX ADMIN — ONDANSETRON HYDROCHLORIDE 4 MG: 2 SOLUTION INTRAMUSCULAR; INTRAVENOUS at 14:20

## 2020-12-05 RX ADMIN — FENTANYL CITRATE 50 MCG: 50 INJECTION INTRAMUSCULAR; INTRAVENOUS at 14:35

## 2020-12-05 RX ADMIN — ROCURONIUM BROMIDE 50 MG: 10 INJECTION INTRAVENOUS at 12:45

## 2020-12-05 RX ADMIN — FENTANYL CITRATE 50 MCG: 50 INJECTION INTRAMUSCULAR; INTRAVENOUS at 13:08

## 2020-12-05 RX ADMIN — FENTANYL CITRATE 50 MCG: 50 INJECTION INTRAMUSCULAR; INTRAVENOUS at 13:13

## 2020-12-05 RX ADMIN — MIDAZOLAM 1 MG: 1 INJECTION INTRAMUSCULAR; INTRAVENOUS at 12:08

## 2020-12-05 RX ADMIN — LIDOCAINE HYDROCHLORIDE 100 MG: 20 INJECTION, SOLUTION INFILTRATION; PERINEURAL at 12:45

## 2020-12-05 RX ADMIN — FAMOTIDINE 20 MG: 10 INJECTION INTRAVENOUS at 12:08

## 2020-12-05 NOTE — ANESTHESIA POSTPROCEDURE EVALUATION
"Patient: Pedro Gould    Procedure Summary     Date: 12/05/20 Room / Location: [facility] OR  / [facility] MAIN OR    Anesthesia Start: 1230 Anesthesia Stop: 1459    Procedure: CHOLECYSTECTOMY LAPAROSCOPIC INTRAOPERATIVE CHOLANGIOGRAM (N/A Abdomen) Diagnosis:       Acute pancreatitis, unspecified complication status, unspecified pancreatitis type      Calculus of gallbladder with cholecystitis without biliary obstruction, unspecified cholecystitis acuity      (Acute pancreatitis, unspecified complication status, unspecified pancreatitis type [K85.90])      (Calculus of gallbladder with cholecystitis without biliary obstruction, unspecified cholecystitis acuity [K80.10])    Surgeon: Phu Savage MD Provider: Mitchell Ramirez MD    Anesthesia Type: general ASA Status: 3          Anesthesia Type: general    Vitals  Vitals Value Taken Time   /86 12/05/20 1545   Temp 36.5 °C (97.7 °F) 12/05/20 1545   Pulse 78 12/05/20 1545   Resp 18 12/05/20 1545   SpO2 94 % 12/05/20 1545           Post Anesthesia Care and Evaluation    Patient location during evaluation: bedside  Patient participation: complete - patient participated  Level of consciousness: sleepy but conscious  Pain score: 0  Pain management: adequate  Airway patency: patent  Anesthetic complications: No anesthetic complications    Cardiovascular status: acceptable  Respiratory status: acceptable  Hydration status: acceptable    Comments: /86 (BP Location: Left arm, Patient Position: Lying)   Pulse 78   Temp 36.5 °C (97.7 °F) (Oral)   Resp 18   Ht 180.3 cm (71\")   Wt 110 kg (243 lb)   SpO2 94%   BMI 33.89 kg/m²         "

## 2020-12-05 NOTE — OP NOTE
PREOPERATIVE DIAGNOSIS:   Cholelithiasis.  Gallstone pancreatitis.    POSTOPERATIVE DIAGNOSIS:   Cholelithiasis.  Gallstone pancreatitis  Cholecystitis.    PROCEDURE:   Laparoscopic cholecystectomy with intraoperative fluoroscopic cholangiogram.    SURGEON: Phu Savage M.D.    ASSISTANT: Jose Enrique Soliz PA-C.   SPECIMENS: Gallbladder.  INTRAOPERATIVE COMPLICATIONS: None.  ANESTHESIA: General.  BLOOD LOSS:  10 mL.  COUNTS: Needle and sponge counts correct.     INDICATIONS: This is a pleasant patient who presented to the hospital with acute pancreatitis.  He underwent imaging studies showing gallstones.    DESCRIPTION OF PROCEDURE: The patient was brought to the operating room in stable condition. Perioperative antibiotics were given and sequential compression devices were in place. He was then positioned supine on the operating room table. General anesthesia was induced without difficulty.    Access to the peritoneum was achieved in the supraumbilical position by a cutdown technique. A blunt 12-mm trocar was secured to the fascia with 0 Vicryl stay sutures. The abdomen was then insufflated to 15 mmHg pressure with carbon dioxide. A brief survey of the abdominal cavity revealed no evidence of injury from insertion of the trocar. The patient was placed in a reverse Trendelenburg position with the left-side tilted slightly down. Three additional 5-mm trocars were placed- in the subxyphoid, right subcostal, and right flank positions. This was under laparoscopic vision.       There were adhesions between the gallbladder and omentum, and the gallbladder wall was thickened, consistent with chronic cholecystitis. The peritoneum at the base of the gallbladder was scored from laterally to medially, terminating at the level of the cystic artery. The peritoneum was gently stripped down, exposing the cystic duct. After completely exposing the cystic duct, a retro-cystic window was created. A firm critical view was  obtained, visualizing the cystic duct and cystic artery. There was no evidence of tenting of the common bile duct.     A single clip was placed on the proximal cystic duct.  A small incision was made in the cystic duct with laparoscopic scissors just distal to the clip.  A cholangiogram catheter was introduced into the abdominal cavity.  It was directed into the cystic duct.  It was held in place with a Hemoclip.  A fluoroscopic cholangiogram was obtained.  I was able to see the entire common bile duct.  I was able to see the branches of the hepatic ducts.  There was a thin wisp of contrast escaping into the duodenum.  There were no intraluminal filling defects.  I think the sluggish flow is a consequence of compression of the distal common bile duct from inflamed pancreas.  The cystic duct cholangiogram catheter was removed.    The cystic duct and artery were clipped and divided. The gallbladder was elevated off the liver bed with electrocautery. An effort to careful and meticulous hemostasis was undertaken. The gallbladder was placed in an endoscopic retrieval bag. It was removed through the umbilical trocar site without difficulty.    A final survey of the operative site was undertaken, and there was no evidence of bleeding or intrabdominal injury. The insufflation was evacuated as the trocars were removed under laparoscopic vision. The umbilical fascia was closed with interrupted 0 Vicryl suture. The skin incisions were closed with 4-0 Monocryl and adhesive strips.    At the end of the case, all needle and sponge counts were correct, and he was extubated.

## 2020-12-05 NOTE — PLAN OF CARE
Goal Outcome Evaluation:  Plan of Care Reviewed With: patient  Progress: improving     LAP MARY THIS SHIFT. LAP SITE X3 WITH BA C/D/I. O2 2L NC, POST SURGERY. DUE TO VOID. NO C/O PAIN, ICE APPLIED. SAT ON SIDE OF BED. STOOD X1 WITH UNSTEADY BALANCE. IVF AND IV ABT CONTINUES.

## 2020-12-05 NOTE — ANESTHESIA PREPROCEDURE EVALUATION
Anesthesia Evaluation     Patient summary reviewed and Nursing notes reviewed   NPO Solid Status: > 8 hours  NPO Liquid Status: > 8 hours           Airway   Mallampati: III  Neck ROM: full  Possible difficult intubation  Dental - normal exam     Pulmonary     breath sounds clear to auscultation  Cardiovascular     Rhythm: regular        Neuro/Psych  GI/Hepatic/Renal/Endo    (+) obesity,  GERD,      Musculoskeletal     Abdominal   (+) obese,    Substance History      OB/GYN          Other                      Anesthesia Plan    ASA 3     general   (CMAC)  intravenous induction     Anesthetic plan, all risks, benefits, and alternatives have been provided, discussed and informed consent has been obtained with: patient.

## 2020-12-05 NOTE — ANESTHESIA PROCEDURE NOTES
Airway  Urgency: elective    Date/Time: 12/5/2020 12:47 PM  Airway not difficult    General Information and Staff    Patient location during procedure: OR  Anesthesiologist: Mitchell Ramirez MD  CRNA: Marika Nuñez CRNA    Indications and Patient Condition  Indications for airway management: airway protection    Preoxygenated: yes  MILS not maintained throughout  Mask difficulty assessment: 1 - vent by mask    Final Airway Details  Final airway type: endotracheal airway      Successful airway: ETT  Cuffed: yes   Successful intubation technique: direct laryngoscopy  Endotracheal tube insertion site: oral  Blade: Sophie  Blade size: 4  ETT size (mm): 7.5  Cormack-Lehane Classification: grade I - full view of glottis  Placement verified by: chest auscultation and capnometry   Measured from: lips  ETT/EBT  to lips (cm): 23  Number of attempts at approach: 1  Assessment: lips, teeth, and gum same as pre-op and atraumatic intubation    Additional Comments  Dentition intact and unchanged. CBEBS.  +ETCO2.

## 2020-12-05 NOTE — PLAN OF CARE
Goal Outcome Evaluation:  Plan of Care Reviewed With: patient  Progress: no change   Pt. Supposed to have surgery today, 24hrs urine collecting done, sent to lab, no voiced c/o pain, ATB IV tx done as ordered, Pt. NPO since midnight for surgerty,

## 2020-12-05 NOTE — PROGRESS NOTES
Name: Pedro Gould ADMIT: 12/3/2020   : 1940  PCP: Hesham Delacruz MD    MRN: [id number] LOS: 2 days   AGE/SEX: 80 y.o. male  ROOM: [facility] OR/MAIN OR     Subjective   Subjective   No new problems noted overnight.  Denies abdominal pain denies nausea or vomiting    Review of Systems   Constitutional: Negative.    Respiratory: Negative.    Cardiovascular: Negative.    Gastrointestinal: Negative.         Complains of heartburn        Objective   Objective   Vital Signs  Temp:  [98.3 °F (36.8 °C)-98.9 °F (37.2 °C)] 98.3 °F (36.8 °C)  Heart Rate:  [83-98] 98  Resp:  [18] 18  BP: (135-154)/(61-96) 135/88  SpO2:  [92 %-96 %] 96 %  on   ;   Device (Oxygen Therapy): room air  Body mass index is 33.89 kg/m².  Physical Exam  Vitals signs and nursing note reviewed.   Constitutional:       General: He is not in acute distress.  HENT:      Head: Normocephalic and atraumatic.   Eyes:      General: No scleral icterus.     Extraocular Movements: Extraocular movements intact.   Neck:      Musculoskeletal: Neck supple.   Cardiovascular:      Rate and Rhythm: Normal rate and regular rhythm.      Pulses: Normal pulses.      Heart sounds: Normal heart sounds.   Pulmonary:      Effort: Pulmonary effort is normal.      Breath sounds: Normal breath sounds.   Abdominal:      General: Abdomen is flat. Bowel sounds are normal.      Palpations: Abdomen is soft.      Tenderness: There is no guarding or rebound.   Musculoskeletal: Normal range of motion.   Skin:     General: Skin is warm.   Neurological:      General: No focal deficit present.      Mental Status: He is alert and oriented to person, place, and time.   Psychiatric:         Mood and Affect: Mood normal.         Results Review     I reviewed the patient's new clinical results.  Results from last 7 days   Lab Units 20  0509 20  0618 20  1601   WBC 10*3/mm3 14.37* 18.12* 19.74*   HEMOGLOBIN g/dL 12.6* 13.3 15.5   PLATELETS 10*3/mm3 233 226 247      Results from last 7 days   Lab Units 12/05/20  0509 12/04/20  0618 12/03/20  1601   SODIUM mmol/L 131* 134* 131*   POTASSIUM mmol/L 3.5 3.8 4.3   CHLORIDE mmol/L 98 99 95*   CO2 mmol/L 23.6 24.3 26.2   BUN mg/dL 10 14 14   CREATININE mg/dL 0.78 0.91 1.01   GLUCOSE mg/dL 109* 131* 128*   Estimated Creatinine Clearance: 96 mL/min (by C-G formula based on SCr of 0.78 mg/dL).  Results from last 7 days   Lab Units 12/05/20  0509 12/04/20  0618 12/03/20  1601   ALBUMIN g/dL 3.30* 3.70 4.20   BILIRUBIN mg/dL 1.1 2.0* 2.7*   ALK PHOS U/L 88 90 110   AST (SGOT) U/L 24 29 40   ALT (SGPT) U/L 71* 103* 161*     Results from last 7 days   Lab Units 12/05/20  0509 12/04/20  0618 12/03/20  1601   CALCIUM mg/dL 8.2* 8.5* 9.1   ALBUMIN g/dL 3.30* 3.70 4.20     Results from last 7 days   Lab Units 12/03/20  1640   PROCALCITONIN ng/mL 0.22     COVID19   Date Value Ref Range Status   12/03/2020 Not Detected Not Detected - Ref. Range Final     Hemoglobin A1C   Date/Time Value Ref Range Status   12/04/2020 0618 6.20 (H) 4.80 - 5.60 % Final       US Gallbladder  Narrative: US GALLBLADDER-  12/03/2020     HISTORY: Gallstones.     The gallbladder is largely filled with echogenic gallstones with  posterior shadowing. Gallbladder wall is at the upper limits of normal  for thickness. Small amount of pericholecystic fluid is seen.     The common bile duct is not dilated measuring up to 6.0 mm.     Liver appears echogenic consistent with fatty infiltration. No focal  hepatic lesions are seen.     Right kidney measures 10.2 cm in length. 2 small right renal cysts are  seen largest measuring approximately 15 mm.     Impression: Cholelithiasis.  2. Borderline gallbladder wall thickening and pericholecystic fluid.  3. Fatty infiltration of the liver.  4. No biliary dilatation is seen.  5. Right renal cysts.              This report was finalized on 12/4/2020 6:55 PM by Dr. Bridger Cadet M.D.     MRI abdomen w wo contrast mrcp  MRI ABDOMEN  WITH AND WITHOUT CONTRAST     HISTORY: Pancreatitis; lipase as high as 182 and trending down and now  at 84.     TECHNIQUE: Multiplanar, multisequence MRI of the abdomen was performed  before and after the IV administration of 20 mL of MultiHance.     COMPARISON: CT abdomen and pelvis 12/03/2020.     FINDINGS:  Moderate to severe hepatic steatosis is present. Colonic diverticulosis  is present within the visualized abdomen. There is a moderate hiatal  hernia.     The gallbladder is at the upper limits of normal to mildly distended and  is filled with gallstones. There is no intra or extrahepatic biliary  ductal dilation. No focal suspicious hepatic lesion is visualized. The  portal and hepatic veins are patent. The splenic vein and artery are  also patent.     There is a 1.2 cm nonenhancing cystic lesion within the pancreatic body.  There is a 0.6 cm cystic lesion within the pancreatic head which appears  to directly abut the main pancreatic duct. There are also a few tiny  focal T2 hyperintensities within the pancreatic tail.  The main  pancreatic duct is nondistended. Peripancreatic ill-defined T2  hyperintense stranding and fluid are present. There is no large segment  of nonenhancement or T1 hyperintensity on precontrast imaging; however,  the most inferior aspect of the pancreatic head is collimated out of the  field-of-view on early postcontrast imaging. Evaluation of this area is  incomplete. There is an area of T2 hyperintensity within the anterior  superior aspect of the pancreatic head which corresponds with an area of  fat density on CT and also demonstrates signal dropout on fat-saturated  T1-weighted weighted imaging and Luanne Ink artifact on out-of-phase  imaging indicating the presence of macroscopic fat.     IMPRESSION  1.  Findings of acute pancreatitis. No visualized findings of pancreatic  hemorrhage or necrosis. Area of insinuating fat is present within the  pancreatic head as detailed above.  2.   A 1.1 cm hypodense lesion within the pancreatic body while favored  to represent a primary pancreatic cystic lesion, an acute loculated  peripancreatic fluid collection cannot be excluded and therefore  continued attention on follow-up with MRI abdomen in 6 weeks is  recommended to ensure stability and exclude this possibility. The  smaller T2 hyperintense lesions within the pancreatic head and tail  while favored to represent dilated side branches should also be followed  up at this time as well.  3.  Gallbladder is borderline distended and filled with gallstones.  There are no findings of choledocholithiasis. Underlying early  cholecystitis cannot be excluded and correlation with patient history is  recommended with follow-up HIDA scan if clinically indicated.  4.  Hepatic steatosis.  5.  Other findings as above.     This report was finalized on 12/4/2020 5:14 PM by Dr. Taye Pierce M.D.       Scheduled Medications  [MAR Hold] enoxaparin, 40 mg, Subcutaneous, Q24H  [MAR Hold] influenza vaccine, 0.5 mL, Intramuscular, Once  levoFLOXacin, 500 mg, Intravenous, Q24H  [MAR Hold] pantoprazole, 40 mg, Intravenous, Q AM    Infusions  sodium chloride, 125 mL/hr, Last Rate: 125 mL/hr (12/04/20 1400)    Diet  NPO Diet       Assessment/Plan     Active Hospital Problems    Diagnosis  POA   • **Acute biliary pancreatitis without infection or necrosis [K85.10]  Yes   • Obesity (BMI 30-39.9) [E66.9]  Yes   • Proteinuria [R80.9]  Yes   • Leukocytosis [D72.829]  Yes   • Hyponatremia [E87.1]  Yes   • Calculus of gallbladder with cholecystitis without biliary obstruction [K80.10]  Yes   • GERD (gastroesophageal reflux disease) [K21.9]  Yes      Resolved Hospital Problems   No resolved problems to display.       80 y.o. male admitted with Acute biliary pancreatitis without infection or necrosis.    · Acute gallbladder pancreatitis/early mild cholecystitis on MRI and ultrasound: [place] [id number] for planned laparoscopic  cholecystectomy.  · Obesity BMI 33.89: Encourage diet and exercise after discharge.  · Hyponatremia: Mild most likely secondary to dehydration.  Continue intravenous fluids postop recheck BMP in the morning.  · GERD: Continue proton pump inhibitor  · Proteinuria/hematuria: 24-hour urine pending we will order PSA.  · Hyperglycemia: Hemoglobin A1c 6.2.  Encourage diet exercise postop post discharge follow-up with primary care physician.  · Lovenox 40 mg SC daily for DVT prophylaxis.  · Full code.  · Discussed with patient.  · Anticipate discharge home in 1-2 days.      Phillip Crane MD  [facility]  12/05/20  11:50 EST    Patient was wearing facemask when I entered the room and throughout our encounter.  I wore protective equipment throughout this patient encounter including a face mask, gloves and protective eyewear.  Hand hygiene was performed before donning protective equipment and after removal when leaving the room.

## 2020-12-06 ENCOUNTER — READMISSION MANAGEMENT (OUTPATIENT)
Dept: CALL CENTER | Facility: HOSPITAL | Age: 80
End: 2020-12-06

## 2020-12-06 VITALS
SYSTOLIC BLOOD PRESSURE: 150 MMHG | DIASTOLIC BLOOD PRESSURE: 87 MMHG | BODY MASS INDEX: 34.02 KG/M2 | WEIGHT: 243 LBS | HEART RATE: 89 BPM | OXYGEN SATURATION: 92 % | HEIGHT: 71 IN | TEMPERATURE: 97.6 F | RESPIRATION RATE: 18 BRPM

## 2020-12-06 LAB
ALBUMIN SERPL-MCNC: 3.2 G/DL (ref 3.5–5.2)
ALBUMIN/GLOB SERPL: 1.1 G/DL
ALP SERPL-CCNC: 265 U/L (ref 39–117)
ALT SERPL W P-5'-P-CCNC: 274 U/L (ref 1–41)
ANION GAP SERPL CALCULATED.3IONS-SCNC: 12.5 MMOL/L (ref 5–15)
AST SERPL-CCNC: 266 U/L (ref 1–40)
BASOPHILS # BLD AUTO: 0.02 10*3/MM3 (ref 0–0.2)
BASOPHILS NFR BLD AUTO: 0.2 % (ref 0–1.5)
BILIRUB SERPL-MCNC: 2.9 MG/DL (ref 0–1.2)
BUN SERPL-MCNC: 12 MG/DL (ref 8–23)
BUN/CREAT SERPL: 12.9 (ref 7–25)
CALCIUM SPEC-SCNC: 8.7 MG/DL (ref 8.6–10.5)
CHLORIDE SERPL-SCNC: 103 MMOL/L (ref 98–107)
CK SERPL-CCNC: 224 U/L (ref 20–200)
CO2 SERPL-SCNC: 20.5 MMOL/L (ref 22–29)
CREAT SERPL-MCNC: 0.93 MG/DL (ref 0.76–1.27)
DEPRECATED RDW RBC AUTO: 38.6 FL (ref 37–54)
EOSINOPHIL # BLD AUTO: 0 10*3/MM3 (ref 0–0.4)
EOSINOPHIL NFR BLD AUTO: 0 % (ref 0.3–6.2)
ERYTHROCYTE [DISTWIDTH] IN BLOOD BY AUTOMATED COUNT: 12.3 % (ref 12.3–15.4)
GFR SERPL CREATININE-BSD FRML MDRD: 79 ML/MIN/1.73
GLOBULIN UR ELPH-MCNC: 2.9 GM/DL
GLUCOSE SERPL-MCNC: 171 MG/DL (ref 65–99)
HCT VFR BLD AUTO: 36.6 % (ref 37.5–51)
HGB BLD-MCNC: 12.6 G/DL (ref 13–17.7)
IMM GRANULOCYTES # BLD AUTO: 0.05 10*3/MM3 (ref 0–0.05)
IMM GRANULOCYTES NFR BLD AUTO: 0.5 % (ref 0–0.5)
LIPASE SERPL-CCNC: 23 U/L (ref 13–60)
LYMPHOCYTES # BLD AUTO: 0.25 10*3/MM3 (ref 0.7–3.1)
LYMPHOCYTES NFR BLD AUTO: 2.4 % (ref 19.6–45.3)
MCH RBC QN AUTO: 30 PG (ref 26.6–33)
MCHC RBC AUTO-ENTMCNC: 34.4 G/DL (ref 31.5–35.7)
MCV RBC AUTO: 87.1 FL (ref 79–97)
MONOCYTES # BLD AUTO: 0.66 10*3/MM3 (ref 0.1–0.9)
MONOCYTES NFR BLD AUTO: 6.3 % (ref 5–12)
NEUTROPHILS NFR BLD AUTO: 9.47 10*3/MM3 (ref 1.7–7)
NEUTROPHILS NFR BLD AUTO: 90.6 % (ref 42.7–76)
NRBC BLD AUTO-RTO: 0.1 /100 WBC (ref 0–0.2)
PLATELET # BLD AUTO: 269 10*3/MM3 (ref 140–450)
PMV BLD AUTO: 10.6 FL (ref 6–12)
POTASSIUM SERPL-SCNC: 3.9 MMOL/L (ref 3.5–5.2)
PROT SERPL-MCNC: 6.1 G/DL (ref 6–8.5)
PSA SERPL-MCNC: 0.97 NG/ML (ref 0–4)
RBC # BLD AUTO: 4.2 10*6/MM3 (ref 4.14–5.8)
SODIUM SERPL-SCNC: 136 MMOL/L (ref 136–145)
WBC # BLD AUTO: 10.45 10*3/MM3 (ref 3.4–10.8)

## 2020-12-06 PROCEDURE — 83690 ASSAY OF LIPASE: CPT | Performed by: SURGERY

## 2020-12-06 PROCEDURE — 82550 ASSAY OF CK (CPK): CPT | Performed by: SURGERY

## 2020-12-06 PROCEDURE — 99024 POSTOP FOLLOW-UP VISIT: CPT | Performed by: SURGERY

## 2020-12-06 PROCEDURE — 25010000002 ENOXAPARIN PER 10 MG: Performed by: SURGERY

## 2020-12-06 PROCEDURE — 85025 COMPLETE CBC W/AUTO DIFF WBC: CPT | Performed by: SURGERY

## 2020-12-06 PROCEDURE — 80053 COMPREHEN METABOLIC PANEL: CPT | Performed by: SURGERY

## 2020-12-06 PROCEDURE — 84153 ASSAY OF PSA TOTAL: CPT | Performed by: SURGERY

## 2020-12-06 RX ADMIN — SODIUM CHLORIDE, PRESERVATIVE FREE 10 ML: 5 INJECTION INTRAVENOUS at 08:05

## 2020-12-06 RX ADMIN — ENOXAPARIN SODIUM 40 MG: 40 INJECTION SUBCUTANEOUS at 08:04

## 2020-12-06 RX ADMIN — SODIUM CHLORIDE 125 ML/HR: 9 INJECTION, SOLUTION INTRAVENOUS at 04:54

## 2020-12-06 RX ADMIN — PANTOPRAZOLE SODIUM 40 MG: 40 INJECTION, POWDER, FOR SOLUTION INTRAVENOUS at 05:46

## 2020-12-06 NOTE — PROGRESS NOTES
Chief Complaint:    POD 1, S/P laparoscopic cholecystectomy with cholangiogram    Subjective:    Tolerating diet.  No abdominal pain.  No nausea.    Objective:    Vitals:    12/05/20 1545 12/05/20 2002 12/05/20 2326 12/06/20 0745   BP: 140/86 145/94 151/100 150/87   BP Location: Left arm  Left arm Left arm   Patient Position: Lying  Lying Lying   Pulse: 78 89     Resp: 18 18 16 18   Temp: 97.7 °F (36.5 °C) 98.9 °F (37.2 °C) 97.7 °F (36.5 °C) 97.6 °F (36.4 °C)   TempSrc: Oral Oral Oral Oral   SpO2: 94% 92%     Weight:       Height:           Lungs: Clear  Heart: Regular  Abdomen: Incisions look okay. BS present.  Abdomen is nondistended.  Extremities: Warm    Labs reviewed.  WBC 10.45, Hgb 12.6, platelets 269.  Creat 0.93.  AlkP 265, , , TBili 2.9    Assessment:    POD 1, S/P laparoscopic cholecystectomy with cholangiogram    Plan:    I reviewed the cholangiogram was normal.  There is narrowing of the distal common bile duct, but on the first images I can see a wispy contrast get into the duodenum.  I do not see any filling defects.  I think the laboratory assessment today represents pressurization of the bile duct from cholangiogram.  He is very determined to go home today.  I will arrange for repeating the CMP in a few days.  We discussed return to the hospital or call my office if there is evidence of jaundice or increasing abdominal pain, increasing nausea, or fever.

## 2020-12-06 NOTE — OUTREACH NOTE
Prep Survey      Responses   [facility] patient discharged from?  [place]   Is LACE score < 7 ?  Yes   Eligibility  [facility]   Date of Admission  12/03/20   Date of Discharge  12/06/20   Discharge Disposition  Home or Self Care   Discharge diagnosis  Acute biliary pancreatitis, Lap carlo   Does the patient have one of the following disease processes/diagnoses(primary or secondary)?  General Surgery   Does the patient have Home health ordered?  No   Is there a DME ordered?  No   Prep survey completed?  Yes          Alexandria Bain RN

## 2020-12-06 NOTE — PLAN OF CARE
Goal Outcome Evaluation:  Plan of Care Reviewed With: patient  Progress: improving       DISCHARGING HOME. NO C/O PAIN. UP AT [facility]. INSTRUCTIONS GIVEN PER DR LOVING, WITH VERBAL UNDERSTANDING.

## 2020-12-06 NOTE — PLAN OF CARE
Goal Outcome Evaluation:  Plan of Care Reviewed With: patient  Progress: improving   Pt. Monitored for post operation, with dressing to ABD. Area intact, no drainage noted, no voiced c/o pain, ATB IV tx done during this shift, no s/s acute distress noted, resting in bed quietly at this shift

## 2020-12-06 NOTE — DISCHARGE SUMMARY
Patient Name: Pedro Gould  : 1940  MRN: [id number]    Date of Admission: 12/3/2020  Date of Discharge:  2020  Primary Care Physician: Hesham Delacruz MD      Chief Complaint:   Chest Pain (states he thinks it indigestion )      Discharge Diagnoses     Active Hospital Problems    Diagnosis  POA   • **Acute biliary pancreatitis without infection or necrosis [K85.10]  Yes   • Obesity (BMI 30-39.9) [E66.9]  Yes   • Proteinuria [R80.9]  Yes   • Leukocytosis [D72.829]  Yes   • Hyponatremia [E87.1]  Yes   • Calculus of gallbladder with cholecystitis without biliary obstruction [K80.10]  Yes   • GERD (gastroesophageal reflux disease) [K21.9]  Yes      Resolved Hospital Problems   No resolved problems to display.        Hospital Course     Mr. Gould is a 80 y.o. male with a history of evolving gastroesophageal reflux disease.  Who presented to [facility] initially complaining of dyspepsia and midepigastric pain..  Please see the admitting history and physical for further details.  He was found to have acute pancreatitis, gallstone pancreatitis, cholelithiasis, and chronic cholecystitis.  And was admitted to the hospital for further evaluation and treatment.  Patient was admitted from the emergency department to the medical service.  He was found to have acute pancreatitis.  Imaging studies and revealed gallstones and possible underlying cholecystitis.  General surgery was consulted and Dr. Savage took patient to the operating room on 2020 and patient underwent laparoscopic cholecystectomy with intraoperative fluoroscopic cholangiogram.  Patient has done very well postoperatively.  Tolerated his diet well this morning.  He denies nausea vomiting abdominal pain is been no fevers or chills his lipase is down to 23 this morning.  His white count is 10.45.  Have spoken with the general surgery service patient is to follow-up with them approximately 2 weeks.  Vascular the patient to  follow-up with his primary care provider in 1 week.      Edited by: Cleve Camarena MD at 12/4/2020 1820    Day of Discharge     Subjective:  No complaints    Review of Systems   Constitutional: Negative.    Respiratory: Negative.    Cardiovascular: Negative.    Gastrointestinal: Negative.        Physical Exam:  Temp:  [97.6 °F (36.4 °C)-98.9 °F (37.2 °C)] 97.6 °F (36.4 °C)  Heart Rate:  [78-90] 89  Resp:  [14-18] 18  BP: (134-151)/() 150/87  Body mass index is 33.89 kg/m².  Physical Exam  Vitals signs and nursing note reviewed.   Constitutional:       General: He is not in acute distress.  HENT:      Head: Normocephalic and atraumatic.   Eyes:      General: No scleral icterus.     Extraocular Movements: Extraocular movements intact.   Neck:      Musculoskeletal: Neck supple.   Cardiovascular:      Rate and Rhythm: Normal rate and regular rhythm.      Pulses: Normal pulses.      Heart sounds: Normal heart sounds.   Pulmonary:      Effort: Pulmonary effort is normal.      Breath sounds: Normal breath sounds.   Abdominal:      General: Abdomen is flat. Bowel sounds are normal.      Palpations: Abdomen is soft.      Tenderness: There is no guarding or rebound.   Musculoskeletal: Normal range of motion.   Skin:     General: Skin is warm.   Neurological:      General: No focal deficit present.      Mental Status: He is alert and oriented to person, place, and time.   Psychiatric:         Mood and Affect: Mood normal.   Consultants     Consult Orders (all) (From admission, onward)     Start     Ordered    12/03/20 2133  Inpatient General Surgery Consult  Once     Specialty:  General Surgery  Provider:  Phu Savage MD    12/03/20 2134 12/03/20 1843  [facility] (on-call MD unless specified) Details  Once     Specialty:  Hospitalist  Provider:  (Not yet assigned)    12/03/20 1842              Procedures     Imaging Results (All)     Procedure Component Value Units Date/Time    FL Cholangiogram Operative [[id number]]  Collected: 12/05/20 1610     Updated: 12/05/20 1653    Narrative:      INTRAOPERATIVE CHOLANGIOGRAM     HISTORY: Gallstones.     FINDINGS:  Contrast partially fills the biliary system and several  injections were performed to attempt better filling. There is no  emptying into the duodenum but this may relate to technical factors.  Although the common bile duct is not overly distended, I cannot exclude  the possibility of a stone or stones in the distal common bile duct.     The patient has recent CT scan and MRI scan showing large stones in the  gallbladder but no definite stones are identified in the common bile  duct which is not distended.     There were 14 images obtained and the fluoroscopy time measures 1 minute  31 seconds.     This report was finalized on 12/5/2020 4:50 PM by Dr. Marco Hernández M.D.       US Gallbladder [[id number]] Collected: 12/04/20 1850     Updated: 12/04/20 1858    Narrative:      US GALLBLADDER-  12/03/2020     HISTORY: Gallstones.     The gallbladder is largely filled with echogenic gallstones with  posterior shadowing. Gallbladder wall is at the upper limits of normal  for thickness. Small amount of pericholecystic fluid is seen.     The common bile duct is not dilated measuring up to 6.0 mm.     Liver appears echogenic consistent with fatty infiltration. No focal  hepatic lesions are seen.     Right kidney measures 10.2 cm in length. 2 small right renal cysts are  seen largest measuring approximately 15 mm.       Impression:      Cholelithiasis.  2. Borderline gallbladder wall thickening and pericholecystic fluid.  3. Fatty infiltration of the liver.  4. No biliary dilatation is seen.  5. Right renal cysts.              This report was finalized on 12/4/2020 6:55 PM by Dr. Bridger Cadet M.D.       MRI abdomen w wo contrast mrcp [[id number]] Collected: 12/04/20 1053     Updated: 12/04/20 1717    Narrative:      MRI ABDOMEN WITH AND WITHOUT CONTRAST     HISTORY: Pancreatitis;  lipase as high as 182 and trending down and now  at 84.     TECHNIQUE: Multiplanar, multisequence MRI of the abdomen was performed  before and after the IV administration of 20 mL of MultiHance.     COMPARISON: CT abdomen and pelvis 12/03/2020.     FINDINGS:  Moderate to severe hepatic steatosis is present. Colonic diverticulosis  is present within the visualized abdomen. There is a moderate hiatal  hernia.     The gallbladder is at the upper limits of normal to mildly distended and  is filled with gallstones. There is no intra or extrahepatic biliary  ductal dilation. No focal suspicious hepatic lesion is visualized. The  portal and hepatic veins are patent. The splenic vein and artery are  also patent.     There is a 1.2 cm nonenhancing cystic lesion within the pancreatic body.  There is a 0.6 cm cystic lesion within the pancreatic head which appears  to directly abut the main pancreatic duct. There are also a few tiny  focal T2 hyperintensities within the pancreatic tail.  The main  pancreatic duct is nondistended. Peripancreatic ill-defined T2  hyperintense stranding and fluid are present. There is no large segment  of nonenhancement or T1 hyperintensity on precontrast imaging; however,  the most inferior aspect of the pancreatic head is collimated out of the  field-of-view on early postcontrast imaging. Evaluation of this area is  incomplete. There is an area of T2 hyperintensity within the anterior  superior aspect of the pancreatic head which corresponds with an area of  fat density on CT and also demonstrates signal dropout on fat-saturated  T1-weighted weighted imaging and Luanne Ink artifact on out-of-phase  imaging indicating the presence of macroscopic fat.     IMPRESSION  1.  Findings of acute pancreatitis. No visualized findings of pancreatic  hemorrhage or necrosis. Area of insinuating fat is present within the  pancreatic head as detailed above.  2.  A 1.1 cm hypodense lesion within the pancreatic  body while favored  to represent a primary pancreatic cystic lesion, an acute loculated  peripancreatic fluid collection cannot be excluded and therefore  continued attention on follow-up with MRI abdomen in 6 weeks is  recommended to ensure stability and exclude this possibility. The  smaller T2 hyperintense lesions within the pancreatic head and tail  while favored to represent dilated side branches should also be followed  up at this time as well.  3.  Gallbladder is borderline distended and filled with gallstones.  There are no findings of choledocholithiasis. Underlying early  cholecystitis cannot be excluded and correlation with patient history is  recommended with follow-up HIDA scan if clinically indicated.  4.  Hepatic steatosis.  5.  Other findings as above.     This report was finalized on 12/4/2020 5:14 PM by Dr. Taye Pierce M.D.       CT Abdomen Pelvis With Contrast [[id number]] Collected: 12/03/20 1833     Updated: 12/03/20 1846    Narrative:      CT ABDOMEN AND PELVIS WITH IV CONTRAST     HISTORY: Epigastric pain. Pancreatitis.     TECHNIQUE: CT includes axial imaging from the lung bases to the  trochanters with IV contrast.     COMPARISON: None     FINDINGS: There is dependent lower lobe atelectasis. A small-to-moderate  hiatal hernia is present. There is diffuse fat infiltration of the  liver. Multiple gallstones fill the gallbladder without gallbladder  distention or pericholecystic inflammation. The splenic size is normal.  Adrenal glands appear normal. There is abnormal peripancreatic stranding  that is consistent with pancreatitis. There is no evidence for splenic  vein thrombosis. Within the central upper anterior pancreatic body there  is a pancreatic low density lesion measuring 1.1 cm. Wall thickening is  present in the descending portion of the duodenum most likely related to  secondary duodenitis. Small bilateral renal cysts are present. There is  no hydronephrosis. There is no bowel  dilatation or evidence for bowel  obstruction. Sigmoid diverticulosis is present without evidence for  diverticulitis.       Impression:      1. Acute pancreatitis with peripancreatic inflammation. Descending  duodenal wall thickening most likely due to secondary duodenitis.  2. 11 mm low-density lesion within the pancreatic body. Current ACR  recommendations for management of a less than 1.5 cm incidental  pancreatic cyst in a patient of 65-79 years at presentation is to  reimage every 2 years x 5 and stop if stable over 10 years.  3. Cholelithiasis. Gallbladder wall is not thickened and there is no  biliary duct dilatation to suggest gallstone pancreatitis.  4. Small hiatal hernia. Bibasilar atelectasis.     Radiation dose reduction techniques were utilized, including automated  exposure control and exposure modulation based on body size.     This report was finalized on 12/3/2020 6:43 PM by Dr. Hema Lynne M.D.       XR Chest 1 View [[id number]] Collected: 12/03/20 1722     Updated: 12/03/20 1743    Narrative:      CHEST SINGLE VIEW     HISTORY: Mid chest pain. Hiatal hernia.     COMPARISON: None     FINDINGS: Heart size is borderline enlarged. Aortic vascular  calcifications are present. The lungs appear clear and there is no  evidence for pulmonary edema or pleural effusion or infiltrate. Cardiac  monitor and leads are noted.       Impression:      No evidence for active disease in the chest.     This report was finalized on 12/3/2020 5:40 PM by Dr. Hema Lynne M.D.             Pertinent Labs     Results from last 7 days   Lab Units 12/06/20  0923 12/05/20  0509 12/04/20 0618 12/03/20  1601   WBC 10*3/mm3 10.45 14.37* 18.12* 19.74*   HEMOGLOBIN g/dL 12.6* 12.6* 13.3 15.5   PLATELETS 10*3/mm3 269 233 226 247     Results from last 7 days   Lab Units 12/06/20  0923 12/05/20  0509 12/04/20 0618 12/03/20  1601   SODIUM mmol/L 136 131* 134* 131*   POTASSIUM mmol/L 3.9 3.5 3.8 4.3   CHLORIDE mmol/L 103 98  99 95*   CO2 mmol/L 20.5* 23.6 24.3 26.2   BUN mg/dL 12 10 14 14   CREATININE mg/dL 0.93 0.78 0.91 1.01   GLUCOSE mg/dL 171* 109* 131* 128*   Estimated Creatinine Clearance: 82.6 mL/min (by C-G formula based on SCr of 0.93 mg/dL).  Results from last 7 days   Lab Units 12/06/20  0923 12/05/20  0509 12/04/20  0618 12/03/20  1601   ALBUMIN g/dL 3.20* 3.30* 3.70 4.20   BILIRUBIN mg/dL 2.9* 1.1 2.0* 2.7*   ALK PHOS U/L 265* 88 90 110   AST (SGOT) U/L 266* 24 29 40   ALT (SGPT) U/L 274* 71* 103* 161*     Results from last 7 days   Lab Units 12/06/20  0923 12/05/20  0509 12/04/20  0618 12/03/20  1601   CALCIUM mg/dL 8.7 8.2* 8.5* 9.1   ALBUMIN g/dL 3.20* 3.30* 3.70 4.20     Results from last 7 days   Lab Units 12/06/20  0923 12/04/20  0618 12/03/20  1640   LIPASE U/L 23 84* 182*     Results from last 7 days   Lab Units 12/06/20  0923 12/05/20  0509 12/03/20  1640 12/03/20  1601   CK TOTAL U/L 224* 327*  --   --    TROPONIN T ng/mL  --   --  <0.010 <0.010   PROBNP pg/mL  --   --   --  176.6     Results from last 7 days   Lab Units 12/04/20  1651   CREATININE UR mg/dL 164.0   PROTEIN TOTAL URINE mg/dL 131.0   PROT/CREAT RATIO UR mg/G Crea 798.8*         Invalid input(s): LDLCALC  Results from last 7 days   Lab Units 12/03/20  2237   BLOODCX  No growth at 2 days  No growth at 2 days       Test Results Pending at Discharge     Pending Labs     Order Current Status    Tissue Pathology Exam Collected (12/05/20 9293)    Blood Culture - Blood, Arm, Left Preliminary result    Blood Culture - Blood, Arm, Right Preliminary result          Discharge Details        Discharge Medications      Continue These Medications      Instructions Start Date   NEXIUM 24HR PO   1 capsule, Oral, As Needed             Allergies   Allergen Reactions   • Percocet [Oxycodone-Acetaminophen] Hallucinations   • Celebrex [Celecoxib] Itching   • Hydrocodone Rash   • Penicillins Rash     HAD ONE TIME RASH REACTION AFTER HIGH DOSE IV INFUSIONS AFTER INFECTION  "WITH KNEE REPLACEMENT, BUT Pt. STATES \"HE PREVIOUSLY HAD TOLERATED PENICILLIN AND AMOXICILLIN BEFORE WITHOUT ANY PROBLEMS\"         Discharge Disposition:  Home or Self Care    Discharge Diet:  Diet Order   Procedures   • Diet Regular       Discharge Activity:   Activity Instructions     Discharge activity      1) May walk and climb stairs as tolerated.   2) Return to school / work in 2 weeks.  3) May shower tomorrow.  4) Do not lift / push / pull more then 10 lbs for 2 weeks.  5) May drive in 1 week if no longer taking pain medications.          CODE STATUS:    Code Status and Medical Interventions:   Ordered at: 12/03/20 1910     Code Status:    CPR     Medical Interventions (Level of Support Prior to Arrest):    Full       No future appointments.  Additional Instructions for the Follow-ups that You Need to Schedule     Discharge Follow-up with Specified Provider: Dr Savage; 2 Weeks   As directed      To: Dr Savage    Follow Up: 2 Weeks    Follow Up Details: Call the office [phone] to schedule an appointment. If desired, it may be scheduled as atelephone visit.           Follow-up Information     Hesham Delacruz MD Follow up in 1 week(s).    Specialty: Family Medicine  Contact information:  [address] [facility] [address]  [facility] [address] [phone]             Phu Savage MD Follow up in 2 week(s).    Specialty: General Surgery  Why: Please call the office for an appointment. If desired, it may be scheduled as a telephone visit  Contact information:  [address]  [place] [id number] [phone]                   Additional Instructions for the Follow-ups that You Need to Schedule     Discharge Follow-up with Specified Provider: Dr Savage; 2 Weeks   As directed      To: Dr Savage    Follow Up: 2 Weeks    Follow Up Details: Call the office [phone] to schedule an appointment. If desired, it may be scheduled as atelephone visit.           Time Spent on Discharge:  " Greater than 30 minutes      Phillip Crane MD  [facility]  12/06/20  12:54 EST

## 2020-12-07 NOTE — PROGRESS NOTES
Case Management Discharge Note      Final Note: Pt discharged home on 12/6.   YVETTE Marcelo RN    Provided Post Acute Provider List?: Refused  Refused Provider List Comment: declined    Selected Continued Care - Discharged on 12/6/2020 Admission date: 12/3/2020 - Discharge disposition: Home or Self Care    Destination    No services have been selected for the patient.              Durable Medical Equipment    No services have been selected for the patient.              Dialysis/Infusion    No services have been selected for the patient.              Home Medical Care    No services have been selected for the patient.              Therapy    No services have been selected for the patient.              Community Resources    No services have been selected for the patient.                       Final Discharge Disposition Code: 01 - home or self-care

## 2020-12-08 ENCOUNTER — TRANSITIONAL CARE MANAGEMENT TELEPHONE ENCOUNTER (OUTPATIENT)
Dept: CALL CENTER | Facility: HOSPITAL | Age: 80
End: 2020-12-08

## 2020-12-08 LAB
BACTERIA SPEC AEROBE CULT: NORMAL
BACTERIA SPEC AEROBE CULT: NORMAL
LAB AP CASE REPORT: NORMAL
PATH REPORT.FINAL DX SPEC: NORMAL
PATH REPORT.GROSS SPEC: NORMAL

## 2020-12-08 NOTE — OUTREACH NOTE
Call Center TCM Note      Responses   [facility] patient discharged from?  [place]   Does the patient have one of the following disease processes/diagnoses(primary or secondary)?  General Surgery   TCM attempt successful?  No   Unsuccessful attempts  Attempt 1          Melanie Mathis MA    12/8/2020, 15:20 EST

## 2020-12-08 NOTE — OUTREACH NOTE
Call Center TCM Note      Responses   [facility] patient discharged from?  [place]   Does the patient have one of the following disease processes/diagnoses(primary or secondary)?  General Surgery   TCM attempt successful?  No   Unsuccessful attempts  Attempt 2          Melanie Mathis MA    12/8/2020, 16:22 EST

## 2020-12-09 ENCOUNTER — TRANSITIONAL CARE MANAGEMENT TELEPHONE ENCOUNTER (OUTPATIENT)
Dept: CALL CENTER | Facility: HOSPITAL | Age: 80
End: 2020-12-09

## 2020-12-09 NOTE — OUTREACH NOTE
Call Center TCM Note      Responses   [facility] patient discharged from?  [place]   Does the patient have one of the following disease processes/diagnoses(primary or secondary)?  General Surgery   TCM attempt successful?  No   Unsuccessful attempts  Attempt 3          Kalina Narayan LPN    12/9/2020, 15:53 EST

## 2020-12-18 ENCOUNTER — OFFICE VISIT (OUTPATIENT)
Dept: SURGERY | Facility: CLINIC | Age: 80
End: 2020-12-18

## 2020-12-18 DIAGNOSIS — K80.12 CALCULUS OF GALLBLADDER WITH ACUTE ON CHRONIC CHOLECYSTITIS WITHOUT OBSTRUCTION: Primary | ICD-10-CM

## 2020-12-18 PROCEDURE — 99024 POSTOP FOLLOW-UP VISIT: CPT | Performed by: SURGERY

## 2020-12-18 NOTE — PROGRESS NOTES
CHIEF COMPLAINT:     Follow-up from laparoscopic cholecystectomy    With the consent of the patient, this visit was scheduled as a phone visit to comply with patient safety concerns in accordance with CDC recommendations. Total time of discussion was 5 minutes.     HISTORY OF PRESENT ILLNESS:    Pedor Gould is a 80 y.o. male who underwent laparoscopic cholecystectomy with intraoperative cholangiogram on 12/5/2020 for cholelithiasis and gallstone pancreatitis. Since discharge from the hospital,  he has practically returned to normal.  Last week there was fatigue and loss of appetite, but this has dramatically improved.  Postoperative pain has resolved.  There is no nausea or vomiting. There been a few loose bowel movements.  He is tolerating a regular diet. There are no urinary complaints.      EXAM:     Deferred for telephone visit.     I reviewed the pathology report with the patient in the office today.  It showed cholelithiasis and chronic cholecystitis.  I reviewed the images and the report from the cholangiogram.  There was poor filling of the duodenum, but I did not see any filling defects.    ASSESSMENT:     Status post laparoscopic cystectomy with cholangiogram on 12/5/2020 for cholelithiasis and gallstone pancreatitis.     PLAN:    Doing well.  We discussed diet and activity.    Follow-up as needed.

## 2021-03-09 DIAGNOSIS — Z23 IMMUNIZATION DUE: ICD-10-CM

## 2022-04-27 ENCOUNTER — OFFICE VISIT (OUTPATIENT)
Dept: FAMILY MEDICINE CLINIC | Facility: CLINIC | Age: 82
End: 2022-04-27

## 2022-04-27 VITALS
OXYGEN SATURATION: 96 % | WEIGHT: 211 LBS | RESPIRATION RATE: 16 BRPM | TEMPERATURE: 97.9 F | SYSTOLIC BLOOD PRESSURE: 126 MMHG | DIASTOLIC BLOOD PRESSURE: 90 MMHG | HEIGHT: 71 IN | BODY MASS INDEX: 29.54 KG/M2 | HEART RATE: 81 BPM

## 2022-04-27 DIAGNOSIS — K21.9 GASTROESOPHAGEAL REFLUX DISEASE WITHOUT ESOPHAGITIS: ICD-10-CM

## 2022-04-27 DIAGNOSIS — R68.2 DRY MOUTH: ICD-10-CM

## 2022-04-27 DIAGNOSIS — Z87.19 HISTORY OF PANCREATITIS: ICD-10-CM

## 2022-04-27 DIAGNOSIS — E55.9 VITAMIN D DEFICIENCY: ICD-10-CM

## 2022-04-27 DIAGNOSIS — R20.2 PARESTHESIA OF LEFT LEG: ICD-10-CM

## 2022-04-27 DIAGNOSIS — R73.01 ELEVATED FASTING BLOOD SUGAR: ICD-10-CM

## 2022-04-27 DIAGNOSIS — Z13.6 SCREENING FOR ISCHEMIC HEART DISEASE: ICD-10-CM

## 2022-04-27 DIAGNOSIS — R35.1 NOCTURIA: ICD-10-CM

## 2022-04-27 DIAGNOSIS — R63.4 WEIGHT LOSS: Primary | ICD-10-CM

## 2022-04-27 PROBLEM — K85.10 ACUTE BILIARY PANCREATITIS WITHOUT INFECTION OR NECROSIS: Status: RESOLVED | Noted: 2020-12-03 | Resolved: 2022-04-27

## 2022-04-27 PROBLEM — D72.829 LEUKOCYTOSIS: Status: RESOLVED | Noted: 2020-12-03 | Resolved: 2022-04-27

## 2022-04-27 PROBLEM — K80.10 CALCULUS OF GALLBLADDER WITH CHOLECYSTITIS WITHOUT BILIARY OBSTRUCTION: Status: RESOLVED | Noted: 2020-12-03 | Resolved: 2022-04-27

## 2022-04-27 PROBLEM — E66.9 OBESITY (BMI 30-39.9): Status: RESOLVED | Noted: 2020-12-03 | Resolved: 2022-04-27

## 2022-04-27 PROBLEM — E87.1 HYPONATREMIA: Status: RESOLVED | Noted: 2020-12-03 | Resolved: 2022-04-27

## 2022-04-27 PROBLEM — R80.9 PROTEINURIA: Status: RESOLVED | Noted: 2020-12-03 | Resolved: 2022-04-27

## 2022-04-27 PROCEDURE — 99214 OFFICE O/P EST MOD 30 MIN: CPT | Performed by: FAMILY MEDICINE

## 2022-04-27 NOTE — PROGRESS NOTES
"Chief Complaint  Weight Loss    Subjective          Pedro presents to [facility]  To evaluate recent weight loss.  He has changed his diet since the beginning of the year.  He is eating more sensibly and has had recent weight loss over the past 8 weeks.  Now has noticed that he has lost about 10 to 20 pounds.  Some mild decrease in his appetite noted.  He has increased his fluid intake and goes to the bathroom every 2 hours or so.  He does wake up to go to the bathroom at night.  He complains of some dry mouth.  Previous GERD symptoms have improved with weight loss.  He occasionally takes Tums.  He continues to have some mild numbness of the feet but that also has improved.  Previously he had vitamin D deficiency on the chart.        Objective   Vital Signs:   Vitals:    04/27/22 1542   BP: 126/90   Pulse: 81   Resp: 16   Temp: 97.9 °F (36.6 °C)   TempSrc: Skin   SpO2: 96%   Weight: 95.7 kg (211 lb)   Height: 179.1 cm (70.5\")               Physical Exam  Vitals reviewed.   Constitutional:       General: He is not in acute distress.  Eyes:      General: Lids are normal.      Conjunctiva/sclera: Conjunctivae normal.   Neck:      Vascular: No carotid bruit.      Trachea: No tracheal deviation.   Cardiovascular:      Rate and Rhythm: Normal rate and regular rhythm.      Heart sounds: Normal heart sounds. No murmur heard.  Pulmonary:      Effort: Pulmonary effort is normal.      Breath sounds: Normal breath sounds.   Musculoskeletal:      Right lower leg: No edema.      Left lower leg: No edema.   Skin:     General: Skin is warm and dry.   Neurological:      Mental Status: He is alert. He is not disoriented.   Psychiatric:         Speech: Speech normal.         Behavior: Behavior normal. Behavior is cooperative.          Result Review :                Assessment and Plan    Diagnoses and all orders for this visit:    1. Weight loss (Primary)  -     Comprehensive Metabolic Panel; Future  -  "    CBC & Differential; Future  -     TSH; Future  -     Lipase; Future  -     Urinalysis With Culture If Indicated -; Future    2. Dry mouth  -     TSH; Future    3. Nocturia  -     PSA DIAGNOSTIC; Future  -     Urinalysis With Culture If Indicated -; Future    4. Gastroesophageal reflux disease without esophagitis  -     CBC & Differential; Future  -     H. Pylori Antibody, IgG; Future    5. Vitamin D deficiency  -     Vitamin D 25 Hydroxy; Future    6. Paresthesia of left foot  -     Comprehensive Metabolic Panel; Future  -     CBC & Differential; Future    7. Screening for ischemic heart disease  -     CK; Future  -     Lipid Panel With / Chol / HDL Ratio; Future    8. History of biliary pancreatitis  -     Lipase; Future        Follow Up   Return in about 2 weeks (around 5/11/2022) for recheck of current condition.  Patient was given instructions and counseling regarding his condition or for health maintenance advice. Please see specific information pulled into the AVS if appropriate.

## 2022-05-11 ENCOUNTER — OFFICE VISIT (OUTPATIENT)
Dept: FAMILY MEDICINE CLINIC | Facility: CLINIC | Age: 82
End: 2022-05-11

## 2022-05-11 ENCOUNTER — REFERRAL TRIAGE (OUTPATIENT)
Dept: CASE MANAGEMENT | Facility: OTHER | Age: 82
End: 2022-05-11

## 2022-05-11 VITALS
RESPIRATION RATE: 16 BRPM | HEART RATE: 59 BPM | DIASTOLIC BLOOD PRESSURE: 77 MMHG | WEIGHT: 210 LBS | OXYGEN SATURATION: 98 % | SYSTOLIC BLOOD PRESSURE: 117 MMHG | HEIGHT: 71 IN | TEMPERATURE: 97.2 F | BODY MASS INDEX: 29.4 KG/M2

## 2022-05-11 DIAGNOSIS — R63.4 WEIGHT LOSS: ICD-10-CM

## 2022-05-11 DIAGNOSIS — R68.2 DRY MOUTH: ICD-10-CM

## 2022-05-11 DIAGNOSIS — R35.1 NOCTURIA: ICD-10-CM

## 2022-05-11 DIAGNOSIS — E78.49 OTHER HYPERLIPIDEMIA: ICD-10-CM

## 2022-05-11 DIAGNOSIS — E10.65 TYPE 1 DIABETES MELLITUS WITH HYPERGLYCEMIA: Primary | ICD-10-CM

## 2022-05-11 DIAGNOSIS — E55.9 VITAMIN D DEFICIENCY: ICD-10-CM

## 2022-05-11 PROCEDURE — 99214 OFFICE O/P EST MOD 30 MIN: CPT | Performed by: FAMILY MEDICINE

## 2022-05-11 RX ORDER — ERGOCALCIFEROL 1.25 MG/1
50000 CAPSULE ORAL
Qty: 12 CAPSULE | Refills: 1 | Status: SHIPPED | OUTPATIENT
Start: 2022-05-11 | End: 2022-06-30

## 2022-05-11 RX ORDER — ROSUVASTATIN CALCIUM 5 MG/1
5 TABLET, COATED ORAL NIGHTLY
Qty: 90 TABLET | Refills: 1 | Status: SHIPPED | OUTPATIENT
Start: 2022-05-11 | End: 2022-06-30

## 2022-05-11 RX ORDER — INSULIN GLARGINE 100 [IU]/ML
10 INJECTION, SOLUTION SUBCUTANEOUS NIGHTLY
Qty: 3 ML | Refills: 1 | Status: SHIPPED | OUTPATIENT
Start: 2022-05-11 | End: 2022-06-15 | Stop reason: SDUPTHER

## 2022-05-11 NOTE — ASSESSMENT & PLAN NOTE
Diabetes is newly identified.   Discussed ways to avoid symptomatic hypoglycemia.  Medication changes per orders.  Endocrinology clinic referral.  Diabetes will be reassessed in 6 months.  Add Lantus to current regimen.  Patient pick this up later today and set up nurse appointment tomorrow for first injection.

## 2022-05-11 NOTE — ASSESSMENT & PLAN NOTE
Nocturia symptoms are most likely worsened due to recent onset insulin-dependent diabetes mellitus.. PSA will be monitored with annual labs.

## 2022-05-11 NOTE — PROGRESS NOTES
"Chief Complaint  Weight Loss (Discuss labs/)    Subjective     {Problem List  Visit Diagnosis  Review (Popup)  Encounters  Notes  Medications  Labs  Result Review Imaging  Media :23}     Pedro presents to [facility]  To review recent labs.  His labs showed elevated fasting blood sugar above 300 and recent hemoglobin A1c was greater than 15.  Last year his hemoglobin A1c was 6.2.  He has had interval weight loss and continues to have dry mouth.  Other lab findings show elevated lipids and low vitamin D.  The remainder of the labs are satisfactory.  PSA 0.5.  Lipase and h pylori are negative.  Review of Systems   HENT:        Dry mouth        Objective   Vital Signs:   Vitals:    05/11/22 0809   BP: 117/77   Pulse: 59   Resp: 16   Temp: 97.2 °F (36.2 °C)   TempSrc: Skin   SpO2: 98%   Weight: 95.3 kg (210 lb)   Height: 179.1 cm (70.5\")               Physical Exam  Vitals reviewed.   Constitutional:       General: He is not in acute distress.  Eyes:      General: Lids are normal.      Conjunctiva/sclera: Conjunctivae normal.   Neck:      Vascular: No carotid bruit.      Trachea: No tracheal deviation.   Cardiovascular:      Rate and Rhythm: Normal rate and regular rhythm.      Heart sounds: Normal heart sounds. No murmur heard.  Pulmonary:      Effort: Pulmonary effort is normal.      Breath sounds: Normal breath sounds.   Skin:     General: Skin is warm and dry.   Neurological:      Mental Status: He is alert. He is not disoriented.   Psychiatric:         Speech: Speech normal.         Behavior: Behavior normal. Behavior is cooperative.          Result Review :       H. Pylori Antibody, IgG (04/28/2022 09:01)  Comprehensive Metabolic Panel (04/28/2022 09:01)-bs 329  CBC & Differential (04/28/2022 09:01)  CK (04/28/2022 09:01)  Lipid Panel With / Chol / HDL Ratio (04/28/2022 09:01)-ldl slight above goal  TSH (04/28/2022 09:01)  PSA DIAGNOSTIC (04/28/2022 09:01)-0.5  Lipase " (04/28/2022 09:01)-49  Vitamin D 25 Hydroxy (04/28/2022 09:01)-15  Hemoglobin A1c (05/09/2022 08:33)-15.3           Assessment and Plan    Diagnoses and all orders for this visit:    1. Type 1 diabetes mellitus with hyperglycemia (HCC) (Primary)  Assessment & Plan:  Diabetes is newly identified.   Discussed ways to avoid symptomatic hypoglycemia.  Medication changes per orders.  Endocrinology clinic referral.  Diabetes will be reassessed in 6 months.  Add Lantus to current regimen.  Patient pick this up later today and set up nurse appointment tomorrow for first injection.    Orders:  -     insulin glargine (LANTUS, SEMGLEE) 100 UNIT/ML injection; Inject 10 Units under the skin into the appropriate area as directed Every Night for 60 days.  Dispense: 3 mL; Refill: 1  -     Ambulatory Referral to Endocrinology    2. Vitamin D deficiency  Assessment & Plan:  Reimplement therapy vitamin D replacement for  this problem.    Orders:  -     vitamin D (ERGOCALCIFEROL) 1.25 MG (52596 UT) capsule capsule; Take 1 capsule by mouth Every 7 (Seven) Days for 180 days.  Dispense: 12 capsule; Refill: 1    3. Nocturia  Assessment & Plan:  Nocturia symptoms are most likely worsened due to recent onset insulin-dependent diabetes mellitus.. PSA will be monitored with annual labs.       4. Other hyperlipidemia  Assessment & Plan:  Lipid abnormalities are worsening.  Pharmacotherapy as ordered.  Lipids will be reassessed in 6 months.    Orders:  -     rosuvastatin (CRESTOR) 5 MG tablet; Take 1 tablet by mouth Every Night for 180 days.  Dispense: 90 tablet; Refill: 1    5. Weight loss  Assessment & Plan:  Weight loss most likely due to onset diabetes.    Orders:  -     Ambulatory Referral to Endocrinology    6. Dry mouth  Assessment & Plan:  Dry mouth symptom of insulin-dependent diabetes.    Orders:  -     Ambulatory Referral to Endocrinology      Follow Up   Return in about 1 day (around 5/12/2022) for Nurse Appointment.  Patient was  given instructions and counseling regarding his condition or for health maintenance advice. Please see specific information pulled into the AVS if appropriate.

## 2022-05-12 ENCOUNTER — PATIENT OUTREACH (OUTPATIENT)
Dept: CASE MANAGEMENT | Facility: OTHER | Age: 82
End: 2022-05-12

## 2022-05-12 DIAGNOSIS — E10.65 TYPE 1 DIABETES MELLITUS WITH HYPERGLYCEMIA: Primary | ICD-10-CM

## 2022-05-12 DIAGNOSIS — E78.49 OTHER HYPERLIPIDEMIA: ICD-10-CM

## 2022-05-12 NOTE — OUTREACH NOTE
AMBULATORY CASE MANAGEMENT NOTE    Name and Relationship of Patient/Support Person: Pedro Gould F - Self    Patient Outreach    Outreach call to patient.  Voicemail left with contact information and the 24/7 nursing call center.         DAKOTAH PEDERSEN  Ambulatory Case Management    5/12/2022, 09:27 EDT

## 2022-05-13 ENCOUNTER — CLINICAL SUPPORT (OUTPATIENT)
Dept: FAMILY MEDICINE CLINIC | Facility: CLINIC | Age: 82
End: 2022-05-13

## 2022-05-13 RX ORDER — PEN NEEDLE, DIABETIC 32 GX 1/6"
1 NEEDLE, DISPOSABLE MISCELLANEOUS NIGHTLY
Qty: 100 EACH | Refills: 1 | Status: SHIPPED | OUTPATIENT
Start: 2022-05-13 | End: 2022-06-16 | Stop reason: SDUPTHER

## 2022-05-13 NOTE — PROGRESS NOTES
I demonstrated how to give insulin injection.  Pt then gave himself his first injection.  Pt states he understands how to give himself injection and he states he will call 's office if he has any questions or concerns.  Thanks

## 2022-05-16 PROBLEM — E11.65 TYPE 2 DIABETES MELLITUS WITH HYPERGLYCEMIA (HCC): Status: ACTIVE | Noted: 2022-04-01

## 2022-05-16 PROBLEM — E10.65 TYPE 1 DIABETES MELLITUS WITH HYPERGLYCEMIA (HCC): Status: RESOLVED | Noted: 2022-05-11 | Resolved: 2022-05-16

## 2022-05-16 PROBLEM — N18.2 CRI (CHRONIC RENAL INSUFFICIENCY), STAGE 2 (MILD): Status: ACTIVE | Noted: 2022-04-01

## 2022-05-16 PROBLEM — E78.00 HYPERCHOLESTEROLEMIA: Status: RESOLVED | Noted: 2022-05-16 | Resolved: 2022-05-16

## 2022-05-16 PROBLEM — E78.2 MIXED HYPERLIPIDEMIA: Status: ACTIVE | Noted: 2022-05-16

## 2022-05-16 PROBLEM — E78.00 HYPERCHOLESTEROLEMIA: Status: ACTIVE | Noted: 2022-05-16

## 2022-05-16 PROBLEM — R63.4 WEIGHT LOSS: Status: RESOLVED | Noted: 2022-04-27 | Resolved: 2022-05-16

## 2022-05-16 PROBLEM — E78.49 OTHER HYPERLIPIDEMIA: Status: RESOLVED | Noted: 2022-05-11 | Resolved: 2022-05-16

## 2022-06-06 ENCOUNTER — PATIENT OUTREACH (OUTPATIENT)
Dept: CASE MANAGEMENT | Facility: OTHER | Age: 82
End: 2022-06-06

## 2022-06-06 DIAGNOSIS — N18.2 CRI (CHRONIC RENAL INSUFFICIENCY), STAGE 2 (MILD): ICD-10-CM

## 2022-06-06 DIAGNOSIS — E10.65 TYPE 1 DIABETES MELLITUS WITH HYPERGLYCEMIA: Primary | ICD-10-CM

## 2022-06-06 NOTE — OUTREACH NOTE
AMBULATORY CASE MANAGEMENT NOTE    Name and Relationship of Patient/Support Person: Pedro Gould F - Self    Declined due to unable to reach after 4 attempts    Education Documentation  No documentation found.        DAKOTAH PEDERSEN  Ambulatory Case Management    6/6/2022, 11:17 EDT

## 2022-06-09 ENCOUNTER — OFFICE VISIT (OUTPATIENT)
Dept: FAMILY MEDICINE CLINIC | Facility: CLINIC | Age: 82
End: 2022-06-09

## 2022-06-09 VITALS
HEART RATE: 52 BPM | BODY MASS INDEX: 29.4 KG/M2 | WEIGHT: 210 LBS | RESPIRATION RATE: 18 BRPM | OXYGEN SATURATION: 98 % | DIASTOLIC BLOOD PRESSURE: 64 MMHG | HEIGHT: 71 IN | TEMPERATURE: 98.2 F | SYSTOLIC BLOOD PRESSURE: 124 MMHG

## 2022-06-09 DIAGNOSIS — B02.9 HERPES ZOSTER WITHOUT COMPLICATION: Primary | ICD-10-CM

## 2022-06-09 PROCEDURE — 99213 OFFICE O/P EST LOW 20 MIN: CPT | Performed by: FAMILY MEDICINE

## 2022-06-09 NOTE — PROGRESS NOTES
"Chief Complaint  Chest Pain (Left side of chest x 2wks, radiates to back and arm, denies injury /\"Soreness\" )    Bulmaro Vasquez presents to [facility]  With chief complaint of left chest soreness.  He has had this issue over the past couple of weeks.  No increased pain with exertion.  No shortness of breath.  Possibly some association after eating.        Objective   Vital Signs:   Vitals:    06/09/22 0904   BP: 124/64   Pulse: 52   Resp: 18   Temp: 98.2 °F (36.8 °C)   SpO2: 98%   Weight: 95.3 kg (210 lb)   Height: 179.1 cm (70.5\")   PainSc:   1                Physical Exam  Vitals reviewed.   Constitutional:       General: He is not in acute distress.  Cardiovascular:      Rate and Rhythm: Normal rate and regular rhythm.      Heart sounds: Normal heart sounds.   Pulmonary:      Effort: Pulmonary effort is normal.      Breath sounds: Normal breath sounds.   Skin:     Comments: Typical herpes zoster rash in distribution of left anterior chest and also left thorax.  No secondary bacterial infection.  Some of the herpetic lesions are starting to dry up.   Neurological:      General: No focal deficit present.      Mental Status: He is alert.   Psychiatric:         Attention and Perception: Attention normal.         Mood and Affect: Mood normal.         Behavior: Behavior normal.          Result Review :                Assessment and Plan    Diagnoses and all orders for this visit:    1. Herpes zoster without complication (Primary)  Assessment & Plan:  New problem.  Mild to moderate pain.  No medication currently indicated.  Patient will call if the pain worsens.  Follow-up as needed or 2 months if pain persists.        Follow Up   Return if symptoms worsen or fail to improve.  Patient was given instructions and counseling regarding his condition or for health maintenance advice. Please see specific information pulled into the AVS if appropriate.   "

## 2022-06-09 NOTE — ASSESSMENT & PLAN NOTE
New problem.  Mild to moderate pain.  No medication currently indicated.  Patient will call if the pain worsens.  Follow-up as needed or 2 months if pain persists.

## 2022-06-15 ENCOUNTER — OFFICE VISIT (OUTPATIENT)
Dept: ENDOCRINOLOGY | Age: 82
End: 2022-06-15

## 2022-06-15 VITALS
HEART RATE: 62 BPM | DIASTOLIC BLOOD PRESSURE: 70 MMHG | TEMPERATURE: 97.3 F | SYSTOLIC BLOOD PRESSURE: 130 MMHG | OXYGEN SATURATION: 97 % | WEIGHT: 208 LBS | BODY MASS INDEX: 29.78 KG/M2 | HEIGHT: 70 IN

## 2022-06-15 DIAGNOSIS — Z79.4 TYPE 2 DIABETES MELLITUS WITH HYPERGLYCEMIA, WITH LONG-TERM CURRENT USE OF INSULIN: Primary | ICD-10-CM

## 2022-06-15 DIAGNOSIS — E11.65 TYPE 2 DIABETES MELLITUS WITH HYPERGLYCEMIA, WITH LONG-TERM CURRENT USE OF INSULIN: Primary | ICD-10-CM

## 2022-06-15 PROCEDURE — 99204 OFFICE O/P NEW MOD 45 MIN: CPT | Performed by: INTERNAL MEDICINE

## 2022-06-15 RX ORDER — METFORMIN HYDROCHLORIDE 500 MG/1
500 TABLET, EXTENDED RELEASE ORAL
Qty: 30 TABLET | Refills: 11 | Status: SHIPPED | OUTPATIENT
Start: 2022-06-15 | End: 2022-06-16 | Stop reason: SDUPTHER

## 2022-06-15 RX ORDER — BLOOD SUGAR DIAGNOSTIC
STRIP MISCELLANEOUS
Qty: 500 EACH | Refills: 4 | Status: SHIPPED | OUTPATIENT
Start: 2022-06-15 | End: 2022-06-16 | Stop reason: SDUPTHER

## 2022-06-15 RX ORDER — INSULIN GLARGINE 100 [IU]/ML
INJECTION, SOLUTION SUBCUTANEOUS
Qty: 15 ML | Refills: 1 | Status: SHIPPED | OUTPATIENT
Start: 2022-06-15 | End: 2022-06-16 | Stop reason: SDUPTHER

## 2022-06-15 RX ORDER — LANCETS 33 GAUGE
1 EACH MISCELLANEOUS
Qty: 500 EACH | Refills: 4 | Status: SHIPPED | OUTPATIENT
Start: 2022-06-15 | End: 2022-06-16 | Stop reason: SDUPTHER

## 2022-06-15 NOTE — PATIENT INSTRUCTIONS
Home instructions  Diabetes medications    Measure glucose before each meal and bedtime    Take  Lantus [ glargine ] 10 units at bedtime. Increase to 14 units.   Every 3 days increase by 2 units if morning average glucose before breakfast is over 150 mg/dl  Metformine XR 500mg  before dinner.   me  Goal of morning glucose is  mg/dl  Goal of 2 hrs after meals is 140-180 mg/dl.      -------------------------------------------------------------------------------------------------------------------------    Using cell phone or computer Mealtime total carbohydrate intake     [employer] applications.      Meal planning  75 g of total carbohydrates plan  Meals 3 meals and 3 snacks  Please limit meals to 75g per meal (5 servings)  Please limit snacks to 30g per snack (2 serving)    Counting total carbohydrates use phone [email or web address] calorie [email or web address]    1 serving equal 15 g (about half a cup)  2 servings equal 30 g (about 1 cup)  3 servings equals 45 g  4 servings equal 60 g  5 servings ago 75 g  6 servings equals 90 g    Breakfast example (5 servings of carbohydrates)    A.  Half a cup cereal (1 servings) and 1 cup milk (1 serving) and 1 fruit/half a cup fruit juice (1 serving), 1 slice of bread with peanut butter (1 serving) = 4servings carbohydrate    B.  Eggs plus mueller plus sausage plus cheese with 2 slices of bread (2 servings) and one yogurt (1 serving )and one fruit (1 serving) and half a cup hash brown potatoes (1 serving) =5 servings carbohydrate    Lunch or dinner example  A.  Plato equals 2 slices of bread (2 servings) and 1 fruit (1 serving), half a cup of potato salad (1 serving) and half a cup of ice cream (1 serving ) = 5 servings carbohydrates    B.  Any meat/chicken/fish and 2 cups Pasta (4 servings) and 1 slice of bread (1 serving)  = 5 servings carbohydrate    C.  Any meat chicken fish 1 cup of rice (3 servings), 1 fruit (1 serving) = 4 servings of carbohydrate    May add vegetables/salads to any meal  for free    Except the following are 1 serving of carbohydrates    A.  Lima beans half a cup (1 serving)  B.  1 cup kidney/nash beans (1 serving 30 g -14 g fiber equals 16 g)  C.  Half a cup of corn (1 serving)  D.  Half cup of peas (1 serving)  E.  Half a cup of potato, cubes (medium potato) (1 serving)  F  Sweets: Prep packaged 3 ounce ice cream cup, Bustillo brand/Nestlé 1 serving (15 g carbohydrates)  Notes: Fruits: 1 fruit about the size for lady's visit is 1 serving of carbohydrate  Half a cup fruit juice 1 serving    Snack examples (1 serving carbohydrate/15 g] You are allowed 30 g per snack due to height.    1.  No carbohydrates (0 servings):  Raw celery, radishes, bell pepper, banana peppers, corn, carrots, cucumbers dipped in salad dressings, guacamole, [place] seasoning, peanut butter, almond butter, salsa    2.  15 g (1 serving): Half a sandwich: Turkey and cheese, smoked salmon and cream cheese, peanut butter and banana slices, cheese and crackers, peanut butter and crackers, half a bagel or half an English muffin or 1 cup of fruit plus cottage cheese or 1 rice cake with sugar-free fruit spreading    3.  15 g or less (1 serving): Protein shakes: Ensure DM, Glucerna, boost protein: Wings and salad dressing, 1 taco    4.  Fruit, 1 servin apple/orange/8 large grapes/peach/a protein/fat (avocado/nuts/meat/ cottage cheese)      Hypercholesterolemia  meal planning  Nutrition counseling  1. Improve glucose control  2. Limit saturated fats to less than 30% at each meal [Use Calorie  bailey to read product labels]  3. Limit or avoid the following fats: Mueller, butter, lard, coconut. Substitute with turkey mueller, other vegetable oils, margarine  Limit or avoid marbling/fat on beef. Avoid hamburgers. Substitute with turkey/chicken/venison/vegetarians/salmon burgers.  Limit dairy: Milk cheese butter.  Limit eggs to 3 a week. Substitute with unlimited egg whites.  Limit bagels, biscuits, croissants: Substitute  with wheat bread, English muffin, kath pockets, tortilla

## 2022-06-15 NOTE — PROGRESS NOTES
Chief Complaint  No chief complaint on file.    Subjective      Pedro Gould presents to [facility]  History of Present Illness  82-year-old  male presenting for evaluation management of new onset type 2 diabetes as requested by Dr. Hesham Delacruz MD.  Lab work included evidence of severe hyperglycemia.  15.3 Important  (05/09/22) HG A1c ** None **   6.20 Important  (12/04/20) HG A1c      Patient came to medical attention due to loss of about 25 lbs over 8 weeks in May 2022. He was diagnosed with Diabetes.  Insulin glargine 10 units at bedtime started and consult endocrine.  June 2022 had shingles thoracic left side.  No postherpetic neuralgia.    Duration of diabetes: 3 months.  Previously had glucose intolerance with hemoglobin A1c 6.2% December 4, 2020.    Monitoring glucose:  CGM use: None  Glucometer use: None  How often patient is testing: None  In the last month lowest glucose: None  In the last month highest glucose: None  Symptoms: Patient indicates he feels well.  He is frequently urinating overnight.  Hypoglycemia: None  Hyperglycemia: Polyuria and polydipsia  Peripheral neuropathy symptoms of paresthesias burning numbness tingling: Patient has neuropathy that was diagnosed before the diabetes diagnosis.  He believes symptoms are little bit better.  Vision changes: Denies  Skin changes or ulcers: Denies  Polyuria: Present  Polydipsia: Present  Gastroparesis symptoms: No early satiety  Prevention:  Last eye examination  Last podiatry visit  Comorbidities  History of heart disease: None  History of kidney disease: None  History of stroke: None    History of hypertension none  History of hyperlipidemia takes rosuvastatin 5 mg daily    Review of systems x 14 : positive as above.    Current Outpatient Medications on File Prior to Visit   Medication Sig Dispense Refill   • Insulin Pen Needle (NovoFine Plus Pen Needle) 32G X 4 MM misc 1 each Every Night. 100 each 1   •  [DISCONTINUED] insulin glargine (LANTUS, SEMGLEE) 100 UNIT/ML injection Inject 10 Units under the skin into the appropriate area as directed Every Night for 60 days. 3 mL 1   • rosuvastatin (CRESTOR) 5 MG tablet Take 1 tablet by mouth Every Night for 180 days. 90 tablet 1   • vitamin D (ERGOCALCIFEROL) 1.25 MG (85730 UT) capsule capsule Take 1 capsule by mouth Every 7 (Seven) Days for 180 days. 12 capsule 1     No current facility-administered medications on file prior to visit.     Past Medical History:   Diagnosis Date   • Abnormal LFTs (liver function tests) 12/2020   • Acute biliary pancreatitis without infection or necrosis 12/03/2020   • Arthritis    • Arthritis of knee, left    • Calculus of gallbladder with cholecystitis without biliary obstruction 12/03/2020    Added automatically from request for surgery [id number]   • CRI (chronic renal insufficiency), stage 2 (mild) 04/2022   • ED (erectile dysfunction)    • Family history of ischemic heart disease     father   • Family history of malignant neoplasm of gastrointestinal tract     mother   • GERD (gastroesophageal reflux disease)    • History of colonoscopy     never   • Impaired glucose metabolism 2020   • Irritant dermatitis     forearms   • Mixed hyperlipidemia    • Nocturia    • Rotator cuff syndrome    • Screening for prostate cancer     unknown   • Sensory neuropathy     bilateral foot   • Trigger middle finger of right hand 12/01/2016   • Type 2 diabetes mellitus with hyperglycemia (HCC) 04/2022     Past Surgical History:   Procedure Laterality Date   • CHOLECYSTECTOMY WITH INTRAOPERATIVE CHOLANGIOGRAM N/A 12/5/2020    Procedure: CHOLECYSTECTOMY LAPAROSCOPIC INTRAOPERATIVE CHOLANGIOGRAM;  Surgeon: Phu Savage MD;  Location: [facility];  Service: General;  Laterality: N/A;   • EYE SURGERY  2014    laser   • JOINT REPLACEMENT Right 03/16/2010   • KNEE SURGERY  04/16/2010    ALSO 7/10 & 9/10; partial revision due to infection R knee; total  "removal of R knee hardware; reimplantation R knee   • REPLACEMENT TOTAL KNEE Left 09/2015     Social History     Socioeconomic History   • Marital status:    Tobacco Use   • Smoking status: Former Smoker   • Smokeless tobacco: Never Used   • Tobacco comment: PIPE   Vaping Use   • Vaping Use: Never used   Substance and Sexual Activity   • Alcohol use: Yes     Comment: occasional   • Drug use: No   • Sexual activity: Defer     Objective   Vital Signs:  /70   Pulse 62   Temp 97.3 °F (36.3 °C) (Temporal)   Ht 177.8 cm (70\")   Wt 94.3 kg (208 lb)   SpO2 97%   BMI 29.84 kg/m²   Estimated body mass index is 29.84 kg/m² as calculated from the following:    Height as of this encounter: 177.8 cm (70\").    Weight as of this encounter: 94.3 kg (208 lb).    BMI is >= 25 and <30. (Overweight) The following options were offered after discussion;: nutrition counseling/recommendations      Physical Exam  Vitals and nursing note reviewed.   HENT:      Head: Normocephalic.      Mouth/Throat:      Mouth: Mucous membranes are moist.   Cardiovascular:      Rate and Rhythm: Normal rate.      Pulses: Normal pulses.      Heart sounds: Normal heart sounds.   Pulmonary:      Effort: Pulmonary effort is normal.      Breath sounds: Normal breath sounds. No wheezing or rhonchi.   Abdominal:      General: Bowel sounds are normal.      Palpations: Abdomen is soft.   Musculoskeletal:         General: No swelling. Normal range of motion.      Cervical back: Normal range of motion and neck supple.   Skin:     General: Skin is warm and dry.   Neurological:      Mental Status: He is alert and oriented to person, place, and time. Mental status is at baseline.   Psychiatric:         Mood and Affect: Mood normal.         Behavior: Behavior normal.         Judgment: Judgment normal.        Result Review :he following data was reviewed by: Nisreen Hill MD on 06/15/2022:               Component      Latest Ref Rng & Units " 4/28/2022 4/28/2022 4/28/2022 5/9/2022           9:01 AM  9:01 AM  9:01 AM  8:33 AM   Glucose      65 - 99 mg/dL 329 (H)      BUN      8 - 27 mg/dL 13      Creatinine      0.76 - 1.27 mg/dL 0.91      EGFR Result      >59 mL/min/1.73 85      BUN/Creatinine Ratio      10 - 24 14      Sodium      134 - 144 mmol/L 138      Potassium      3.5 - 5.2 mmol/L 4.9      Chloride      96 - 106 mmol/L 96      CO2      20 - 29 mmol/L 22      Calcium      8.6 - 10.2 mg/dL 9.5      Total Protein      6.0 - 8.5 g/dL 6.2      Albumin      3.7 - 4.7 g/dL 4.3      Globulin      1.5 - 4.5 g/dL 1.9      A/G Ratio      1.2 - 2.2 2.3 (H)      Total Bilirubin      0.0 - 1.2 mg/dL 1.2      Alkaline Phosphatase      44 - 121 IU/L 110      AST (SGOT)      0 - 40 IU/L 14      ALT (SGPT)      0 - 44 IU/L 17      Total Cholesterol      100 - 199 mg/dL  164     Triglycerides      0 - 149 mg/dL  243 (H)     HDL Cholesterol      >39 mg/dL  32 (L)     VLDL Cholesterol Ned      5 - 40 mg/dL  41 (H)     LDL Cholesterol       0 - 99 mg/dL  91     Chol/HDL Ratio      0.0 - 5.0 ratio  5.1 (H)     TSH Baseline      0.450 - 4.500 uIU/mL   2.960    Hemoglobin A1C      4.8 - 5.6 %    15.3 (H)     Assessment and Plan Diagnoses and all orders for this visit:    1. Type 2 diabetes mellitus with hyperglycemia, with long-term current use of insulin (HCC) (Primary)  -     metFORMIN ER (Glucophage XR) 500 MG 24 hr tablet; Take 1 tablet by mouth Daily With Breakfast.  Dispense: 30 tablet; Refill: 11  -     insulin glargine (LANTUS, SEMGLEE) 100 UNIT/ML injection; Up to 50 units per day as directed starting at 14 units at bedtime.  Dispense: 15 mL; Refill: 1  -     Basic Metabolic Panel  -     Microalbumin / Creatinine Urine Ratio - Urine, Clean Catch  -     TSH Rfx On Abnormal To Free T4  -     C-Peptide      89 year old  male diagnosed with type 2 diabetes May 2022.  Comorbidities include a BMI of 29.84, peripheral neuropathy that occurred prior to  diagnosis of diabetes.  Plan improve meal planning start carbohydrate counting.  Increase insulin.  Add metformin.  Home instructions  Diabetes medications    Measure glucose before each meal and bedtime    Take  Lantus [ glargine ] 10 units at bedtime. Increase to 14 units.   Every 3 days increase by 2 units if morning average glucose before breakfast is over 150 mg/dl  Take Metformine XR 500mg  before dinner.   me  Goal of morning glucose is  mg/dl  Goal of 2 hrs after meals is 140-180 mg/dl.    -------------------------------------------------------------------------------------------------------------------------    Using cell phone or computer Mealtime total carbohydrate intake     [employer] applications.      Meal planning  75 g of total carbohydrates plan  Meals 3 meals and 3 snacks  Please limit meals to 75g per meal (5 servings)  Please limit snacks to 30g per snack (2 serving)    Counting total carbohydrates use phone [email or web address] calorie [email or web address]    1 serving equal 15 g (about half a cup)  2 servings equal 30 g (about 1 cup)  3 servings equals 45 g  4 servings equal 60 g  5 servings ago 75 g  6 servings equals 90 g    Breakfast example (5 servings of carbohydrates)    A.  Half a cup cereal (1 servings) and 1 cup milk (1 serving) and 1 fruit/half a cup fruit juice (1 serving), 1 slice of bread with peanut butter (1 serving) = 4servings carbohydrate    B.  Eggs plus mueller plus sausage plus cheese with 2 slices of bread (2 servings) and one yogurt (1 serving )and one fruit (1 serving) and half a cup hash brown potatoes (1 serving) =5 servings carbohydrate    Lunch or dinner example  A.  [place] equals 2 slices of bread (2 servings) and 1 fruit (1 serving), half a cup of potato salad (1 serving) and half a cup of ice cream (1 serving ) = 5 servings carbohydrates    B.  Any meat/chicken/fish and 2 cups Pasta (4 servings) and 1 slice of bread (1 serving)  = 5 servings carbohydrate    C.  Any meat chicken fish  1 cup of rice (3 servings), 1 fruit (1 serving) = 4 servings of carbohydrate    May add vegetables/salads to any meal for free    Except the following are 1 serving of carbohydrates    A.  Lima beans half a cup (1 serving)  B.  1 cup kidney/nash beans (1 serving 30 g -14 g fiber equals 16 g)  C.  Half a cup of corn (1 serving)  D.  Half cup of peas (1 serving)  E.  Half a cup of potato, cubes (medium potato) (1 serving)  F  Sweets: Prep packaged 3 ounce ice cream cup, Bustillo brand/Nestlé 1 serving (15 g carbohydrates)  Notes: Fruits: 1 fruit about the size for lady's visit is 1 serving of carbohydrate  Half a cup fruit juice 1 serving    Snack examples (1 serving carbohydrate/15 g] You are allowed 30 g per snack due to height.    1.  No carbohydrates (0 servings):  Raw celery, radishes, bell pepper, banana peppers, corn, carrots, cucumbers dipped in salad dressings, guacamole, [place] seasoning, peanut butter, almond butter, salsa    2.  15 g (1 serving): Half a sandwich: Turkey and cheese, smoked salmon and cream cheese, peanut butter and banana slices, cheese and crackers, peanut butter and crackers, half a bagel or half an English muffin or 1 cup of fruit plus cottage cheese or 1 rice cake with sugar-free fruit spreading    3.  15 g or less (1 serving): Protein shakes: Ensure DM, Glucerna, boost protein: Wings and salad dressing, 1 taco    4.  Fruit, 1 servin apple/orange/8 large grapes/peach/a protein/fat (avocado/nuts/meat/ cottage cheese)      Hypercholesterolemia  meal planning  Nutrition counseling  1. Improve glucose control  2. Limit saturated fats to less than 30% at each meal [Use Calorie  bailey to read product labels]  3. Limit or avoid the following fats: Mueller, butter, lard, coconut. Substitute with turkey mueller, other vegetable oils, margarine  Limit or avoid marbling/fat on beef. Avoid hamburgers. Substitute with turkey/chicken/venison/vegetarians/salmon burgers.  Limit dairy: Milk cheese  butter.  Limit eggs to 3 a week. Substitute with unlimited egg whites.  Limit bagels, biscuits, croissants: Substitute with wheat bread, English muffin, kath pockets, tortilla       I spent 50 minutes caring for Pedro on this date of service. This time includes time spent by me in the following activities:reviewing tests, performing a medically appropriate examination and/or evaluation , counseling and educating the patient/family/caregiver and ordering medications, tests, or procedures  Follow Up Return in about 3 weeks (around 7/6/2022) for Recheck.  Patient was given instructions and counseling regarding his condition or for health maintenance advice. Please see specific information pulled into the AVS if appropriate.

## 2022-06-16 DIAGNOSIS — E11.65 TYPE 2 DIABETES MELLITUS WITH HYPERGLYCEMIA, WITH LONG-TERM CURRENT USE OF INSULIN: ICD-10-CM

## 2022-06-16 DIAGNOSIS — Z79.4 TYPE 2 DIABETES MELLITUS WITH HYPERGLYCEMIA, WITH LONG-TERM CURRENT USE OF INSULIN: ICD-10-CM

## 2022-06-16 LAB
ALBUMIN/CREAT UR: 5 MG/G CREAT (ref 0–29)
BUN SERPL-MCNC: 11 MG/DL (ref 8–27)
BUN/CREAT SERPL: 13 (ref 10–24)
C PEPTIDE SERPL-MCNC: 1.7 NG/ML (ref 1.1–4.4)
CALCIUM SERPL-MCNC: 9.3 MG/DL (ref 8.6–10.2)
CHLORIDE SERPL-SCNC: 103 MMOL/L (ref 96–106)
CO2 SERPL-SCNC: 24 MMOL/L (ref 20–29)
CREAT SERPL-MCNC: 0.83 MG/DL (ref 0.76–1.27)
CREAT UR-MCNC: 54.6 MG/DL
EGFRCR SERPLBLD CKD-EPI 2021: 88 ML/MIN/1.73
GLUCOSE SERPL-MCNC: 107 MG/DL (ref 65–99)
MICROALBUMIN UR-MCNC: 3 UG/ML
POTASSIUM SERPL-SCNC: 4.6 MMOL/L (ref 3.5–5.2)
SODIUM SERPL-SCNC: 142 MMOL/L (ref 134–144)
TSH SERPL DL<=0.005 MIU/L-ACNC: 2.59 UIU/ML (ref 0.45–4.5)

## 2022-06-16 RX ORDER — INSULIN GLARGINE 100 [IU]/ML
INJECTION, SOLUTION SUBCUTANEOUS
Qty: 15 ML | Refills: 1 | Status: SHIPPED | OUTPATIENT
Start: 2022-06-16

## 2022-06-16 RX ORDER — BLOOD SUGAR DIAGNOSTIC
STRIP MISCELLANEOUS
Qty: 500 EACH | Refills: 3 | Status: SHIPPED | OUTPATIENT
Start: 2022-06-16 | End: 2022-06-24 | Stop reason: SDUPTHER

## 2022-06-16 RX ORDER — METFORMIN HYDROCHLORIDE 500 MG/1
500 TABLET, EXTENDED RELEASE ORAL
Qty: 90 TABLET | Refills: 1 | Status: SHIPPED | OUTPATIENT
Start: 2022-06-16 | End: 2022-08-12

## 2022-06-16 RX ORDER — LANCETS 33 GAUGE
1 EACH MISCELLANEOUS
Qty: 500 EACH | Refills: 3 | Status: SHIPPED | OUTPATIENT
Start: 2022-06-16 | End: 2022-06-24 | Stop reason: SDUPTHER

## 2022-06-16 RX ORDER — PEN NEEDLE, DIABETIC 32 GX 1/6"
1 NEEDLE, DISPOSABLE MISCELLANEOUS NIGHTLY
Qty: 100 EACH | Refills: 3 | Status: SHIPPED | OUTPATIENT
Start: 2022-06-16

## 2022-06-22 ENCOUNTER — TELEPHONE (OUTPATIENT)
Dept: ENDOCRINOLOGY | Age: 82
End: 2022-06-22

## 2022-06-22 NOTE — TELEPHONE ENCOUNTER
PT STATES HIS TEST STRIPS ONE TOUCH WILL NOT FIT THE MONITOR CAN SOMEONE CALL PT TODAY REGARDING THIS MATTER     PLEASE CALL [phone]

## 2022-06-22 NOTE — TELEPHONE ENCOUNTER
6/22 called and sw pt re the test strips told him he needs to call the ins co to see which test strips are covered and we will send it all into the pharmacy

## 2022-06-24 ENCOUNTER — TELEPHONE (OUTPATIENT)
Dept: ENDOCRINOLOGY | Age: 82
End: 2022-06-24

## 2022-06-24 RX ORDER — BLOOD SUGAR DIAGNOSTIC
STRIP MISCELLANEOUS
Qty: 150 EACH | Refills: 6 | Status: SHIPPED | OUTPATIENT
Start: 2022-06-24 | End: 2022-06-29 | Stop reason: ALTCHOICE

## 2022-06-24 RX ORDER — LANCETS 33 GAUGE
1 EACH MISCELLANEOUS
Qty: 150 EACH | Refills: 3 | Status: SHIPPED | OUTPATIENT
Start: 2022-06-24 | End: 2023-02-08 | Stop reason: ALTCHOICE

## 2022-06-24 NOTE — TELEPHONE ENCOUNTER
Spoke with pt though my chart  he did not need a pa it was qty issues  fix that he is needing a CMN from [facility] filled out just waiting for that to be faxed over

## 2022-06-24 NOTE — TELEPHONE ENCOUNTER
PT CALLED WANTING STATUS ON HIS PRIOR AUTH FOR THE ONE TOUCH VERIO TEST STRIPS. HE STATED THE PHARMACY CALLED HIM TO ADVISE THEY HAVE A QUESTION ON THE QTY AND THEY NEED A AUTH AS WELL.      SOFÍA MADSEN  [phone]

## 2022-06-28 ENCOUNTER — TELEPHONE (OUTPATIENT)
Dept: ENDOCRINOLOGY | Age: 82
End: 2022-06-28

## 2022-06-29 ENCOUNTER — TELEPHONE (OUTPATIENT)
Dept: ENDOCRINOLOGY | Age: 82
End: 2022-06-29

## 2022-06-29 RX ORDER — BLOOD-GLUCOSE METER
1 EACH MISCELLANEOUS ONCE
Qty: 1 KIT | Refills: 0 | Status: SHIPPED | OUTPATIENT
Start: 2022-06-29 | End: 2022-06-29

## 2022-06-29 RX ORDER — LANCETS
EACH MISCELLANEOUS
Qty: 300 EACH | Refills: 3 | Status: SHIPPED | OUTPATIENT
Start: 2022-06-29

## 2022-06-30 ENCOUNTER — OFFICE VISIT (OUTPATIENT)
Dept: FAMILY MEDICINE CLINIC | Facility: CLINIC | Age: 82
End: 2022-06-30

## 2022-06-30 VITALS
BODY MASS INDEX: 29.2 KG/M2 | DIASTOLIC BLOOD PRESSURE: 79 MMHG | HEART RATE: 64 BPM | SYSTOLIC BLOOD PRESSURE: 122 MMHG | WEIGHT: 204 LBS | HEIGHT: 70 IN | OXYGEN SATURATION: 98 % | TEMPERATURE: 97.2 F | RESPIRATION RATE: 16 BRPM

## 2022-06-30 DIAGNOSIS — E78.2 MIXED HYPERLIPIDEMIA: ICD-10-CM

## 2022-06-30 DIAGNOSIS — R59.9 LYMPH NODE ENLARGEMENT: Primary | ICD-10-CM

## 2022-06-30 DIAGNOSIS — E55.9 VITAMIN D DEFICIENCY: ICD-10-CM

## 2022-06-30 PROCEDURE — 99214 OFFICE O/P EST MOD 30 MIN: CPT | Performed by: FAMILY MEDICINE

## 2022-06-30 RX ORDER — ROSUVASTATIN CALCIUM 5 MG/1
5 TABLET, COATED ORAL NIGHTLY
Qty: 90 TABLET | Refills: 2 | Status: SHIPPED | OUTPATIENT
Start: 2022-06-30 | End: 2023-02-08 | Stop reason: SDUPTHER

## 2022-06-30 RX ORDER — ERGOCALCIFEROL 1.25 MG/1
50000 CAPSULE ORAL
Qty: 12 CAPSULE | Refills: 2 | Status: SHIPPED | OUTPATIENT
Start: 2022-06-30 | End: 2023-03-27

## 2022-06-30 NOTE — PROGRESS NOTES
"Chief Complaint  Cyst (Left side of jaw /)    Subjective          Pedro presents to [facility]  To discuss lymph node enlargement over the past 10 days.  Recently it has gotten better but he decided to keep the appointment anyway.  He has pretty much recovered from his shingles outbreak and the lymph node occurred soon after the rash.  Initially the lymph node was mildly tender but no other symptoms.  The location of the lymph node is under the left jaw angle.        Objective   Vital Signs:   Vitals:    06/30/22 0946   BP: 122/79   Pulse: 64   Resp: 16   Temp: 97.2 °F (36.2 °C)   TempSrc: Skin   SpO2: 98%   Weight: 92.5 kg (204 lb)   Height: 177.8 cm (70\")             Physical Exam  Lymphadenopathy:      Head:      Left side of head: Submandibular adenopathy present.      Comments: Mild enlarged left submandibular node nontender.  Mobile.  No overlying skin changes.   Skin:     Comments: Left flank herpes zoster virus is resolved.          Result Review :                Assessment and Plan    Diagnoses and all orders for this visit:    1. Lymph node enlargement (Primary)  Comments:  Resolving.  Continue to monitor.  Patient will let us know if it worsens or changes in any way.    2. Mixed hyperlipidemia  Assessment & Plan:  Patient to begin statin therapy.  New prescription sent to mail away pharmacy.  Recheck with next appointment in 2023.    Orders:  -     rosuvastatin (CRESTOR) 5 MG tablet; Take 1 tablet by mouth Every Night for 270 days.  Dispense: 90 tablet; Refill: 2    3. Vitamin D deficiency  Assessment & Plan:  Patient to begin oral replacement of vitamin D.  Recheck with labs in 2023.    Orders:  -     vitamin D (ERGOCALCIFEROL) 1.25 MG (05342 UT) capsule capsule; Take 1 capsule by mouth Every 7 (Seven) Days for 270 days.  Dispense: 12 capsule; Refill: 2      Follow Up   Return in about 7 months (around 1/30/2023) for Medicare Wellness & regular visit, [id number] " min.  Patient was given instructions and counseling regarding his condition or for health maintenance advice. Please see specific information pulled into the AVS if appropriate.

## 2022-06-30 NOTE — ASSESSMENT & PLAN NOTE
Patient to begin statin therapy.  New prescription sent to mail away pharmacy.  Recheck with next appointment in 2023.

## 2022-08-12 ENCOUNTER — OFFICE VISIT (OUTPATIENT)
Dept: ENDOCRINOLOGY | Age: 82
End: 2022-08-12

## 2022-08-12 VITALS
WEIGHT: 204.2 LBS | TEMPERATURE: 98 F | HEART RATE: 64 BPM | DIASTOLIC BLOOD PRESSURE: 80 MMHG | HEIGHT: 70 IN | SYSTOLIC BLOOD PRESSURE: 120 MMHG | OXYGEN SATURATION: 97 % | BODY MASS INDEX: 29.23 KG/M2

## 2022-08-12 DIAGNOSIS — L60.8 NAIL DEFORMITY: ICD-10-CM

## 2022-08-12 DIAGNOSIS — G63 PERIPHERAL NEUROPATHY DUE TO METABOLIC DISORDER: ICD-10-CM

## 2022-08-12 DIAGNOSIS — E88.9 PERIPHERAL NEUROPATHY DUE TO METABOLIC DISORDER: ICD-10-CM

## 2022-08-12 DIAGNOSIS — Z79.4 TYPE 2 DIABETES MELLITUS WITH HYPERGLYCEMIA, WITH LONG-TERM CURRENT USE OF INSULIN: Primary | ICD-10-CM

## 2022-08-12 DIAGNOSIS — E11.65 TYPE 2 DIABETES MELLITUS WITH HYPERGLYCEMIA, WITH LONG-TERM CURRENT USE OF INSULIN: Primary | ICD-10-CM

## 2022-08-12 LAB — GLUCOSE BLDC GLUCOMTR-MCNC: 92 MG/DL (ref 70–130)

## 2022-08-12 PROCEDURE — 99214 OFFICE O/P EST MOD 30 MIN: CPT | Performed by: INTERNAL MEDICINE

## 2022-08-12 PROCEDURE — 82962 GLUCOSE BLOOD TEST: CPT | Performed by: INTERNAL MEDICINE

## 2022-08-12 RX ORDER — BLOOD-GLUCOSE METER
EACH MISCELLANEOUS
COMMUNITY
Start: 2022-07-01

## 2022-08-12 NOTE — PROGRESS NOTES
Chief Complaint  Diabetes (Type2: Patient doesn't have meter with him today but states that he does check his bs 3-5xs daily )    Subjective      Pedro Gould presents to [facility]  History of Present Illness  82-year-old  male presenting for evaluation management of new onset type 2 diabetes as requested by Dr. Hesham Delacruz MD.  Lab work included evidence of severe hyperglycemia.  15.3 Important  (05/09/22) HG A1c ** None **   6.20 Important  (12/04/20) HG A1c      Patient came to medical attention due to loss of about 25 lbs over 8 weeks in May 2022. He was diagnosed with Diabetes.  Insulin glargine 10 units at bedtime started and consult endocrine.  June 2022 had shingles thoracic left side.  No postherpetic neuralgia.    Duration of diabetes: 3 months.  Previously had glucose intolerance with hemoglobin A1c 6.2% December 4, 2020.    Current medications  Lantus 14 units at bedtime  Metformin-he stopped this due to metallic mouth taste.    Monitoring glucose:  CGM use: None.  Last visit this office placed a sample freestyle allan on patient.  Did not like changing it every 10 days and is fine with fingerstick's 3 x daily.   Stopped metformin on Tuesday due to magnetic taste 2 days ago. He feels its better .   Patient stopped insulin July 2018 to August 4 then restarted August 5.  He has restarted his insulin.  Usual glucose before and after meals is between 99 and 130.  Vaginally 150s and he noticed 160s after pizza that he ate.  On and off insulin the numbers appear to be the same.  He has changed his meal planning.  His daughter is a pharmaceutical technician who explained to him that changing his meal plan will significantly improve his diabetes better than medications.  He is taking that the heart and following meal planning.  He has seen a significant improvement.  Glucometer use:Accucheck guide  How often patient is testing: None  In the last month lowest  glucose: None  In the last month highest glucose: None  Symptoms: Patient indicates he feels well.  He is frequently urinating overnight.  Hypoglycemia: None  Hyperglycemia: Polyuria and polydipsia  Peripheral neuropathy symptoms of paresthesias burning numbness tingling: Patient has neuropathy that was diagnosed before the diabetes diagnosis.  He believes symptoms are little bit better.  Vision changes: Denies  Skin changes or ulcers: Denies  Polyuria: Present  Polydipsia: Present  Gastroparesis symptoms: No early satiety  Prevention:  Last eye examination summer 2022.  [facility].  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  He has had cataract surgery.  He is seen there every 3 to 4 months.  Last podiatry visit-never.  He has running as an exercise.  He has hammertoes.  Thickened nails.  He has trouble trimming his right foot toenails as he has had knee replacement and his hamstrings are tight on that side.  Comorbidities  History of heart disease: None  History of kidney disease: None  History of stroke: None    History of hypertension none  History of hyperlipidemia takes rosuvastatin 5 mg daily    Review of systems x 14 : positive as above.    Current Outpatient Medications on File Prior to Visit   Medication Sig Dispense Refill   • Blood Glucose Monitoring Suppl (Accu-Chek Guide) w/Device kit      • glucose blood (Accu-Chek Guide) test strip Dx code E11.65 testing bs 3 x day 300 each 3   • insulin glargine (LANTUS, SEMGLEE) 100 UNIT/ML injection Up to 50 units per day as directed starting at 14 units at bedtime. 15 mL 1   • Insulin Pen Needle (NovoFine Plus Pen Needle) 32G X 4 MM misc 1 each Every Night. 100 each 3   • Lancets (accu-chek multiclix) lancets Testing bs 3 x day 300 each 3   • rosuvastatin (CRESTOR) 5 MG tablet Take 1 tablet by mouth Every Night for 270 days. 90 tablet 2   • vitamin D (ERGOCALCIFEROL) 1.25 MG (33311 UT) capsule capsule Take 1  capsule by mouth Every 7 (Seven) Days for 270 days. 12 capsule 2   • Lancets (OneTouch Delica Plus [id number]) misc 1 each 5 (Five) Times a Day. Check 3 times a day on insulin tx, poor control, hypoglycemia. Dx: E 11.8 150 each 3   • [DISCONTINUED] metFORMIN ER (Glucophage XR) 500 MG 24 hr tablet Take 1 tablet by mouth Daily With Breakfast. 90 tablet 1     No current facility-administered medications on file prior to visit.     Past Medical History:   Diagnosis Date   • Abnormal LFTs (liver function tests) 12/2020   • Acute biliary pancreatitis without infection or necrosis 12/03/2020   • Arthritis    • Arthritis of knee, left    • Calculus of gallbladder with cholecystitis without biliary obstruction 12/03/2020    Added automatically from request for surgery [id number]   • CRI (chronic renal insufficiency), stage 2 (mild) 04/2022   • ED (erectile dysfunction)    • Family history of ischemic heart disease     father   • Family history of malignant neoplasm of gastrointestinal tract     mother   • GERD (gastroesophageal reflux disease)    • History of colonoscopy     never   • Impaired glucose metabolism 2020   • Irritant dermatitis     forearms   • Mixed hyperlipidemia    • Nocturia    • Rotator cuff syndrome    • Screening for prostate cancer     unknown   • Sensory neuropathy     bilateral foot   • Trigger middle finger of right hand 12/01/2016   • Type 2 diabetes mellitus with hyperglycemia (HCC) 04/2022     Past Surgical History:   Procedure Laterality Date   • CHOLECYSTECTOMY WITH INTRAOPERATIVE CHOLANGIOGRAM N/A 12/5/2020    Procedure: CHOLECYSTECTOMY LAPAROSCOPIC INTRAOPERATIVE CHOLANGIOGRAM;  Surgeon: Phu Savage MD;  Location: [facility];  Service: General;  Laterality: N/A;   • EYE SURGERY  2014    laser   • JOINT REPLACEMENT Right 03/16/2010   • KNEE SURGERY  04/16/2010    ALSO 7/10 & 9/10; partial revision due to infection R knee; total removal of R knee hardware; reimplantation R knee   •  "REPLACEMENT TOTAL KNEE Left 09/2015     Social History     Socioeconomic History   • Marital status:    Tobacco Use   • Smoking status: Former Smoker   • Smokeless tobacco: Never Used   • Tobacco comment: PIPE   Vaping Use   • Vaping Use: Never used   Substance and Sexual Activity   • Alcohol use: Yes     Comment: occasional   • Drug use: No   • Sexual activity: Defer     Objective   Vital Signs:  /80   Pulse 64   Temp 98 °F (36.7 °C) (Temporal)   Ht 177.8 cm (70\")   Wt 92.6 kg (204 lb 3.2 oz)   SpO2 97%   BMI 29.30 kg/m²   Estimated body mass index is 29.3 kg/m² as calculated from the following:    Height as of this encounter: 177.8 cm (70\").    Weight as of this encounter: 92.6 kg (204 lb 3.2 oz).    BMI is >= 25 and <30. (Overweight) The following options were offered after discussion;: nutrition counseling/recommendations      Physical Exam  Vitals and nursing note reviewed.   HENT:      Head: Normocephalic.      Mouth/Throat:      Mouth: Mucous membranes are moist.   Cardiovascular:      Rate and Rhythm: Normal rate.      Pulses: Normal pulses.           Dorsalis pedis pulses are 2+ on the right side and 2+ on the left side.        Posterior tibial pulses are 2+ on the right side and 2+ on the left side.      Heart sounds: Normal heart sounds.   Pulmonary:      Effort: Pulmonary effort is normal.      Breath sounds: Normal breath sounds. No wheezing or rhonchi.   Abdominal:      General: Bowel sounds are normal.      Palpations: Abdomen is soft.   Musculoskeletal:         General: No swelling. Normal range of motion.      Cervical back: Normal range of motion and neck supple.   Feet:      Right foot:      Skin integrity: Skin integrity normal. No skin breakdown or callus.      Toenail Condition: Right toenails are normal.      Left foot:      Skin integrity: Skin integrity normal. No skin breakdown or callus.      Toenail Condition: Left toenails are normal.      Comments: Hammertoes multiple " bilateral  Skin:     General: Skin is warm and dry.   Neurological:      Mental Status: He is alert and oriented to person, place, and time. Mental status is at baseline.   Psychiatric:         Mood and Affect: Mood normal.         Behavior: Behavior normal.         Judgment: Judgment normal.        Result Review :he following data was reviewed by: Nisreen Hill MD on 06/15/2022:  Component      Latest Ref Rng & Units 6/15/2022 6/15/2022 6/15/2022 6/15/2022          11:18 AM 11:18 AM 11:18 AM 11:18 AM   Glucose      65 - 99 mg/dL 107 (H)      BUN      8 - 27 mg/dL 11      Creatinine      0.76 - 1.27 mg/dL 0.83      EGFR Result      >59 mL/min/1.73 88      BUN/Creatinine Ratio      10 - 24 13      Sodium      134 - 144 mmol/L 142      Potassium      3.5 - 5.2 mmol/L 4.6      Chloride      96 - 106 mmol/L 103      CO2      20 - 29 mmol/L 24      Calcium      8.6 - 10.2 mg/dL 9.3      Creatinine, Urine      Not Estab. mg/dL  54.6     Microalbumin, Urine      Not Estab. ug/mL  3.0     Microalbumin/Creatinine Ratio      0 - 29 mg/g creat  5     TSH Baseline      0.450 - 4.500 uIU/mL   2.590    C-Peptide      1.1 - 4.4 ng/mL    1.7                Component      Latest Ref Rng & Units 4/28/2022 4/28/2022 4/28/2022 5/9/2022           9:01 AM  9:01 AM  9:01 AM  8:33 AM   Glucose      65 - 99 mg/dL 329 (H)      Total Cholesterol      100 - 199 mg/dL  164     Triglycerides      0 - 149 mg/dL  243 (H)     HDL Cholesterol      >39 mg/dL  32 (L)     VLDL Cholesterol Ned      5 - 40 mg/dL  41 (H)     LDL Cholesterol       0 - 99 mg/dL  91     Chol/HDL Ratio      0.0 - 5.0 ratio  5.1 (H)     TSH Baseline      0.450 - 4.500 uIU/mL   2.960    Hemoglobin A1C      4.8 - 5.6 %    15.3 (H)     Assessment and Plan Diagnoses and all orders for this visit:    1. Type 2 diabetes mellitus with hyperglycemia, with long-term current use of insulin (HCC) (Primary)  -     POC Glucose Fingerstick  -     Hemoglobin A1c  -     Lipid  Panel  -     Microalbumin / Creatinine Urine Ratio - Urine, Clean Catch  -     Ambulatory Referral to Podiatry    2. 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 Nail deformity  -     Hemoglobin A1c  -     Lipid Panel  -     Microalbumin / Creatinine Urine Ratio - Urine, Clean Catch  -     Ambulatory Referral to Podiatry      89 year old  male diagnosed with type 2 diabetes May 2022.  Comorbidities include a BMI of 29.84, peripheral neuropathy that occurred prior to diagnosis of diabetes. On exam he has thick long nails and hammertoes. He also has symptoms of plantar fasciitis.  He enjoys running.  Encouraged him to see a podiatrist for trimming and caring for his feet  Plan improve meal planning start carbohydrate counting.  Increase insulin.  Add metformin.    Eye exam summer 2022 with a follow up in 3 months Nov 15th , He has had cataracts and is followed for a hemorrhages prior to diabetes on left eye  that is chronic and stable at [facility] with Dr Muñoz.     Home instructions  Diabetes medications      Breakfast example (5 servings of carbohydrates)    A.  Half a cup cereal (1 servings) and 1 cup milk (1 serving) and 1 fruit/half a cup fruit juice (1 serving), 1 slice of bread with peanut butter (1 serving) = 4servings carbohydrate    B.  Eggs plus mueller plus sausage plus cheese with 2 slices of bread (2 servings) and one yogurt (1 serving )and one fruit (1 serving) and half a cup hash brown potatoes (1 serving) =5 servings carbohydrate    Lunch or dinner example  A.  Nabb equals 2 slices of bread (2 servings) and 1 fruit (1 serving), half a cup of potato salad (1 serving) and half a cup of ice cream (1 serving ) = 5 servings carbohydrates    B.  Any meat/chicken/fish and 2 cups Pasta (4 servings) and 1 slice of bread (1  serving)  = 5 servings carbohydrate    C.  Any meat chicken fish 1 cup of rice (3 servings), 1 fruit (1 serving) = 4 servings of carbohydrate    May add vegetables/salads to any meal for free    Except the following are 1 serving of carbohydrates    A.  Lima beans half a cup (1 serving)  B.  1 cup kidney/nash beans (1 serving 30 g -14 g fiber equals 16 g)  C.  Half a cup of corn (1 serving)  D.  Half cup of peas (1 serving)  E.  Half a cup of potato, cubes (medium potato) (1 serving)  F  Sweets: Prep packaged 3 ounce ice cream cup, [place]ustillo brand/Nestlé 1 serving (15 g carbohydrates)  Notes: Fruits: 1 fruit about the size for lady's visit is 1 serving of carbohydrate  Half a cup fruit juice 1 serving    Snack examples (1 serving carbohydrate/15 g] You are allowed 30 g per snack due to height.    1.  No carbohydrates (0 servings):  Raw celery, radishes, bell pepper, banana peppers, corn, carrots, cucumbers dipped in salad dressings, guacamole, [place] seasoning, peanut butter, almond butter, salsa    2.  15 g (1 serving): Half a sandwich: Turkey and cheese, smoked salmon and cream cheese, peanut butter and banana slices, cheese and crackers, peanut butter and crackers, half a bagel or half an English muffin or 1 cup of fruit plus cottage cheese or 1 rice cake with sugar-free fruit spreading    3.  15 g or less (1 serving): Protein shakes: Ensure DM, Glucerna, boost protein: Wings and salad dressing, 1 taco    4.  Fruit, 1 servin apple/orange/8 large grapes/peach/a protein/fat (avocado/nuts/meat/ cottage cheese)      Hypercholesterolemia  meal planning  Nutrition counseling  1. Improve glucose control  2. Limit saturated fats to less than 30% at each meal [Use Calorie  bailey to read product labels]  3. Limit or avoid the following fats: Mueller, butter, lard, coconut. Substitute with turkey mueller, other vegetable oils, margarine  Limit or avoid marbling/fat on beef. Avoid hamburgers. Substitute with  turkey/chicken/venison/vegetarians/salmon burgers.  Limit dairy: Milk cheese butter.  Limit eggs to 3 a week. Substitute with unlimited egg whites.  Limit bagels, biscuits, croissants: Substitute with wheat bread, English muffin, kath pockets, tortilla       I spent 50 minutes caring for Pedro on this date of service. This time includes time spent by me in the following activities:reviewing tests, performing a medically appropriate examination and/or evaluation , counseling and educating the patient/family/caregiver and ordering medications, tests, or procedures  Follow Up Return in about 3 months (around 11/12/2022) for Recheck.  Patient was given instructions and counseling regarding his condition or for health maintenance advice. Please see specific information pulled into the AVS if appropriate.

## 2022-08-13 LAB
ALBUMIN/CREAT UR: <5 MG/G CREAT (ref 0–29)
CHOLEST SERPL-MCNC: 92 MG/DL (ref 100–199)
CREAT UR-MCNC: 58.6 MG/DL
HBA1C MFR BLD: 6.8 % (ref 4.8–5.6)
HDLC SERPL-MCNC: 42 MG/DL
IMP & REVIEW OF LAB RESULTS: NORMAL
LDLC SERPL CALC-MCNC: 37 MG/DL (ref 0–99)
MICROALBUMIN UR-MCNC: <3 UG/ML
TRIGL SERPL-MCNC: 57 MG/DL (ref 0–149)
VLDLC SERPL CALC-MCNC: 13 MG/DL (ref 5–40)

## 2022-11-14 ENCOUNTER — OFFICE VISIT (OUTPATIENT)
Dept: ENDOCRINOLOGY | Age: 82
End: 2022-11-14

## 2022-11-14 VITALS
OXYGEN SATURATION: 99 % | HEIGHT: 70 IN | WEIGHT: 208.6 LBS | TEMPERATURE: 97 F | SYSTOLIC BLOOD PRESSURE: 110 MMHG | BODY MASS INDEX: 29.86 KG/M2 | HEART RATE: 50 BPM | DIASTOLIC BLOOD PRESSURE: 70 MMHG

## 2022-11-14 DIAGNOSIS — E78.2 MIXED HYPERLIPIDEMIA: ICD-10-CM

## 2022-11-14 DIAGNOSIS — E11.9 CONTROLLED TYPE 2 DIABETES MELLITUS WITHOUT COMPLICATION, WITH LONG-TERM CURRENT USE OF INSULIN: Primary | ICD-10-CM

## 2022-11-14 DIAGNOSIS — Z79.4 CONTROLLED TYPE 2 DIABETES MELLITUS WITHOUT COMPLICATION, WITH LONG-TERM CURRENT USE OF INSULIN: Primary | ICD-10-CM

## 2022-11-14 PROCEDURE — 99214 OFFICE O/P EST MOD 30 MIN: CPT | Performed by: NURSE PRACTITIONER

## 2022-11-14 NOTE — PROGRESS NOTES
Chief Complaint  Chief Complaint   Patient presents with   • Diabetes     Type2: Patient has his meter with him but its on his phone so unable to download. He has no hx of retinopathy or neuropathy in his feet or hands. The patient has no hx of heart disease, kidney disease, or stroke        Subjective          History of Present Illness    Pedro Gould 82 y.o. presents for a follow-up evaluation for type 2 DM    He has been diabetic since 05/2022 and started insulin in at that time.  C.Peptide normal in 06/22  Pt had a Aunt that had diabetes.    Pt is on the following medications for their DM: Lantus 12 units daily        Metformin stopped due to metallic mouth taste      Pt complains of intermittent numbness and tingling in feet, no pain    Denies chest pain, shortness of breath and vision changes    Weight gain of 4 lbs since last visit.    Last eye exam was - going tomorrow and goes every 4 months  History of cataract surgery    Pt does not have a history of nephropathy.  Patient is not currently taking ACE/ARB    Patient has neuropathy that was diagnosed before the diabetes diagnosis - right foot due knee surgery    Pt does not have a history of CAD or CVA.    Last A1C in 08/22 was 6.8    Last microalbumin in 08/22 was negative          Blood Sugars    Blood glucoses are checked 3/day.    Fasting blood glucoses: 80s - low 100s    Pre-meal blood glucoses: low 100s    Post meal blood glucoses: 90s-150s    Pt has no episodes of hypoglycemia.          Did not like [place] (changing every 14 days)        Walks 3 times a week and walks 2-3 miles each time              Hyperlipidemia     Pt denies any muscle/body aches, chest pain, or shortness of breath    Pt is currently taking Crestor 5 mg HS    Last lipid panel in 08/22 showed Total 92, Triglycerides 57, HDL 42 and LDL 37            I have reviewed the patient's allergies, medicines, past medical hx, family hx and social hx.    Objective   Vital Signs:   BP  "110/70   Pulse 50   Temp 97 °F (36.1 °C) (Temporal)   Ht 177.8 cm (70\")   Wt 94.6 kg (208 lb 9.6 oz)   SpO2 99%   BMI 29.93 kg/m²       Physical Exam   Physical Exam  Constitutional:       General: He is not in acute distress.     Appearance: Normal appearance. He is not diaphoretic.   HENT:      Head: Normocephalic and atraumatic.   Eyes:      General:         Right eye: No discharge.         Left eye: No discharge.   Skin:     General: Skin is warm and dry.   Neurological:      Mental Status: He is alert.   Psychiatric:         Mood and Affect: Mood normal.         Behavior: Behavior normal.                    Results Review:   Hemoglobin A1C   Date Value Ref Range Status   08/12/2022 6.8 (H) 4.8 - 5.6 % Final     Comment:              Prediabetes: 5.7 - 6.4           Diabetes: >6.4           Glycemic control for adults with diabetes: <7.0     12/04/2020 6.20 (H) 4.80 - 5.60 % Final     Triglycerides   Date Value Ref Range Status   08/12/2022 57 0 - 149 mg/dL Final     HDL Cholesterol   Date Value Ref Range Status   08/12/2022 42 >39 mg/dL Final     LDL Chol Calc (NIH)   Date Value Ref Range Status   08/12/2022 37 0 - 99 mg/dL Final     VLDL Cholesterol Ned   Date Value Ref Range Status   08/12/2022 13 5 - 40 mg/dL Final     LDL/HDL Ratio   Date Value Ref Range Status   11/17/2016 2.18  Final         Assessment and Plan {CC Problem List  Visit Diagnosis  ROS  Review (Popup)  Health Maintenance  Quality  BestPractice  Medications  SmartSets  SnapShot Encounters  Media :23  Diagnoses and all orders for this visit:    1. Controlled type 2 diabetes mellitus without complication, with long-term current use of insulin (HCC) (Primary)  -     Hemoglobin A1c  -     Comprehensive Metabolic Panel    Check labs today  Continue with Lantus 12 units daily  Continue with BG checks      2. Mixed hyperlipidemia  -     Comprehensive Metabolic Panel  -     Lipid Panel    Check labs today  Continue with statin       "       No refills needed at this time        Lab today  RTC in 4 months with me, labs prior      Follow Up     Patient was given instructions and counseling regarding her condition or for health maintenance advice. Please see specific information pulled into the AVS if appropriate.              Dannielle Yeager, APRN  11/14/22

## 2022-11-15 LAB
ALBUMIN SERPL-MCNC: 4.6 G/DL (ref 3.5–5.2)
ALBUMIN/GLOB SERPL: 2.9 G/DL
ALP SERPL-CCNC: 83 U/L (ref 39–117)
ALT SERPL-CCNC: 15 U/L (ref 1–41)
AST SERPL-CCNC: 13 U/L (ref 1–40)
BILIRUB SERPL-MCNC: 1.1 MG/DL (ref 0–1.2)
BUN SERPL-MCNC: 19 MG/DL (ref 8–23)
BUN/CREAT SERPL: 20.4 (ref 7–25)
CALCIUM SERPL-MCNC: 9.3 MG/DL (ref 8.6–10.5)
CHLORIDE SERPL-SCNC: 103 MMOL/L (ref 98–107)
CHOLEST SERPL-MCNC: 119 MG/DL (ref 0–200)
CO2 SERPL-SCNC: 27.4 MMOL/L (ref 22–29)
CREAT SERPL-MCNC: 0.93 MG/DL (ref 0.76–1.27)
EGFRCR SERPLBLD CKD-EPI 2021: 83 ML/MIN/1.73
GLOBULIN SER CALC-MCNC: 1.6 GM/DL
GLUCOSE SERPL-MCNC: 81 MG/DL (ref 65–99)
HBA1C MFR BLD: 5.5 % (ref 4.8–5.6)
HDLC SERPL-MCNC: 44 MG/DL (ref 40–60)
IMP & REVIEW OF LAB RESULTS: NORMAL
LDLC SERPL CALC-MCNC: 60 MG/DL (ref 0–100)
POTASSIUM SERPL-SCNC: 4.8 MMOL/L (ref 3.5–5.2)
PROT SERPL-MCNC: 6.2 G/DL (ref 6–8.5)
SODIUM SERPL-SCNC: 140 MMOL/L (ref 136–145)
TRIGL SERPL-MCNC: 73 MG/DL (ref 0–150)
VLDLC SERPL CALC-MCNC: 15 MG/DL (ref 5–40)

## 2023-01-30 ENCOUNTER — TELEPHONE (OUTPATIENT)
Dept: FAMILY MEDICINE CLINIC | Facility: CLINIC | Age: 83
End: 2023-01-30

## 2023-01-30 NOTE — TELEPHONE ENCOUNTER
Caller: Pedro Gould    Relationship to patient: Self    Best call back number: [phone]    Patient is needing: PATIENT CALLED AND SAID THAT HE FORGOT ABOUT HIS MEDICARE WELLNESS VISIT TODAY AND HAD TO CANCEL IT    HE IS WANTING TO SEE WHEN DR. MORAN WANTS TO GET HIM IN FOR HIS MEDICARE WELLNESS, HE SAID HE IS OTHERWISE HEALTHY

## 2023-02-08 ENCOUNTER — OFFICE VISIT (OUTPATIENT)
Dept: FAMILY MEDICINE CLINIC | Facility: CLINIC | Age: 83
End: 2023-02-08
Payer: MEDICARE

## 2023-02-08 VITALS
HEIGHT: 70 IN | WEIGHT: 216 LBS | DIASTOLIC BLOOD PRESSURE: 76 MMHG | OXYGEN SATURATION: 99 % | BODY MASS INDEX: 30.92 KG/M2 | HEART RATE: 62 BPM | SYSTOLIC BLOOD PRESSURE: 122 MMHG

## 2023-02-08 DIAGNOSIS — Z00.00 MEDICARE ANNUAL WELLNESS VISIT, SUBSEQUENT: Primary | ICD-10-CM

## 2023-02-08 DIAGNOSIS — R35.1 NOCTURIA: ICD-10-CM

## 2023-02-08 DIAGNOSIS — E55.9 VITAMIN D DEFICIENCY: ICD-10-CM

## 2023-02-08 DIAGNOSIS — E78.2 MIXED HYPERLIPIDEMIA: ICD-10-CM

## 2023-02-08 DIAGNOSIS — Z23 IMMUNIZATION DUE: ICD-10-CM

## 2023-02-08 PROBLEM — E11.65 TYPE 2 DIABETES MELLITUS WITH HYPERGLYCEMIA: Status: RESOLVED | Noted: 2022-04-01 | Resolved: 2023-02-08

## 2023-02-08 PROBLEM — B02.9 HERPES ZOSTER WITHOUT COMPLICATION: Status: RESOLVED | Noted: 2022-06-09 | Resolved: 2023-02-08

## 2023-02-08 PROCEDURE — 96160 PT-FOCUSED HLTH RISK ASSMT: CPT | Performed by: FAMILY MEDICINE

## 2023-02-08 PROCEDURE — G0009 ADMIN PNEUMOCOCCAL VACCINE: HCPCS | Performed by: FAMILY MEDICINE

## 2023-02-08 PROCEDURE — 1170F FXNL STATUS ASSESSED: CPT | Performed by: FAMILY MEDICINE

## 2023-02-08 PROCEDURE — 1159F MED LIST DOCD IN RCRD: CPT | Performed by: FAMILY MEDICINE

## 2023-02-08 PROCEDURE — 90677 PCV20 VACCINE IM: CPT | Performed by: FAMILY MEDICINE

## 2023-02-08 PROCEDURE — G0439 PPPS, SUBSEQ VISIT: HCPCS | Performed by: FAMILY MEDICINE

## 2023-02-08 PROCEDURE — 99397 PER PM REEVAL EST PAT 65+ YR: CPT | Performed by: FAMILY MEDICINE

## 2023-02-08 RX ORDER — ROSUVASTATIN CALCIUM 5 MG/1
5 TABLET, COATED ORAL NIGHTLY
Qty: 90 TABLET | Refills: 3 | Status: SHIPPED | OUTPATIENT
Start: 2023-02-08 | End: 2023-03-14

## 2023-02-08 NOTE — PROGRESS NOTES
The ABCs of the Annual Wellness Visit  Subsequent Medicare Wellness Visit    Subjective    Pedro Gould is a 82 y.o. male who presents for a Subsequent Medicare Wellness Visit.    The following portions of the patient's history were reviewed and   updated as appropriate: allergies, current medications, past family history, past medical history, past social history, past surgical history and problem list.    Compared to one year ago, the patient feels his physical   health is better.    Compared to one year ago, the patient feels his mental   health is the same.    Recent Hospitalizations:  He was not admitted to the hospital during the last year.       Current Medical Providers:  Patient Care Team:  Hesham Delacruz MD as PCP - General (Family Medicine)  Hesham Delacruz MD as PCP - Family Medicine  Hesham Delacruz MD Wright, James, MD    Outpatient Medications Prior to Visit   Medication Sig Dispense Refill   • Blood Glucose Monitoring Suppl (Accu-Chek Guide) w/Device kit      • glucose blood (Accu-Chek Guide) test strip Dx code E11.65 testing bs 3 x day 300 each 3   • insulin glargine (LANTUS, SEMGLEE) 100 UNIT/ML injection Up to 50 units per day as directed starting at 14 units at bedtime. (Patient taking differently: 14 units at bedtime.) 15 mL 1   • Insulin Pen Needle (NovoFine Plus Pen Needle) 32G X 4 MM misc 1 each Every Night. 100 each 3   • Lancets (accu-chek multiclix) lancets Testing bs 3 x day 300 each 3   • vitamin D (ERGOCALCIFEROL) 1.25 MG (70041 UT) capsule capsule Take 1 capsule by mouth Every 7 (Seven) Days for 270 days. 12 capsule 2   • Lancets (OneTouch Delica Plus [id number]) misc 1 each 5 (Five) Times a Day. Check 3 times a day on insulin tx, poor control, hypoglycemia. Dx: E 11.8 150 each 3   • rosuvastatin (CRESTOR) 5 MG tablet Take 1 tablet by mouth Every Night for 270 days. 90 tablet 2     No facility-administered medications prior to visit.       No opioid medication identified on active  "medication list. I have reviewed chart for other potential  high risk medication/s and harmful drug interactions in the elderly.          Aspirin is not on active medication list.  Aspirin use is not indicated based on review of current medical condition/s. Risk of harm outweighs potential benefits.  .    Patient Active Problem List   Diagnosis   • Nocturia   • Tinnitus aurium   • Vitamin D deficiency   • Sebaceous cyst; posterior left neck   • Paresthesia of left foot   • Dry mouth   • History of biliary pancreatitis   • CRI (chronic renal insufficiency), stage 2 (mild)   • Mixed hyperlipidemia   • Controlled type 2 diabetes mellitus without complication, with long-term current use of insulin (HCC)     Advance Care Planning  Advance Directive is not on file.  ACP discussion was held with the patient during this visit. Patient does not have an advance directive, information provided.     Objective    Vitals:    02/08/23 0914   BP: 122/76   Pulse: 62   SpO2: 99%   Weight: 98 kg (216 lb)   Height: 177.8 cm (70\")     Estimated body mass index is 30.99 kg/m² as calculated from the following:    Height as of this encounter: 177.8 cm (70\").    Weight as of this encounter: 98 kg (216 lb).    BMI is >= 30 and <35. (Class 1 Obesity). The following options were offered after discussion;: weight loss educational material (shared in after visit summary), exercise counseling/recommendations and nutrition counseling/recommendations      Does the patient have evidence of cognitive impairment? No    Lab Results   Component Value Date    CHLPL 119 11/14/2022    TRIG 73 11/14/2022    HDL 44 11/14/2022    LDL 60 11/14/2022    VLDL 15 11/14/2022    HGBA1C 5.50 11/14/2022        HEALTH RISK ASSESSMENT    Smoking Status:  Social History     Tobacco Use   Smoking Status Former   Smokeless Tobacco Never   Tobacco Comments    PIPE     Alcohol Consumption:  Social History     Substance and Sexual Activity   Alcohol Use Yes    Comment: " occasional     Fall Risk Screen:    STEADI Fall Risk Assessment has not been completed.    Depression Screening:  PHQ-2/PHQ-9 Depression Screening 2/8/2023   Little Interest or Pleasure in Doing Things 0-->not at all   Feeling Down, Depressed or Hopeless 0-->not at all   PHQ-9: Brief Depression Severity Measure Score 0       Health Habits and Functional and Cognitive Screening:  Functional & Cognitive Status 2/8/2023   Do you have difficulty preparing food and eating? No   Do you have difficulty bathing yourself, getting dressed or grooming yourself? No   Do you have difficulty using the toilet? No   Do you have difficulty moving around from place to place? No   Do you have trouble with steps or getting out of a bed or a chair? No   Current Diet Well Balanced Diet   Dental Exam Unknown   Eye Exam Up to date   Exercise (times per week) 4 times per week   Current Exercises Include Walking   Do you need help using the phone?  No   Are you deaf or do you have serious difficulty hearing?  No   Do you need help with transportation? No   Do you need help shopping? No   Do you need help preparing meals?  No   Do you need help with housework?  No   Do you need help with laundry? No   Do you need help taking your medications? No   Do you need help managing money? No   Do you ever drive or ride in a car without wearing a seat belt? No   Have you felt unusual stress, anger or loneliness in the last month? No   Who do you live with? Spouse   If you need help, do you have trouble finding someone available to you? No   Do you have difficulty concentrating, remembering or making decisions? No       Age-appropriate Screening Schedule:  Refer to the list below for future screening recommendations based on patient's age, sex and/or medical conditions. Orders for these recommended tests are listed in the plan section. The patient has been provided with a written plan.    Health Maintenance   Topic Date Due   • TDAP/TD VACCINES (1 -  Tdap) Never done   • ZOSTER VACCINE (2 of 3) 02/08/2028 (Originally 5/7/2012)   • HEMOGLOBIN A1C  05/14/2023   • URINE MICROALBUMIN  08/12/2023   • LIPID PANEL  11/14/2023   • DIABETIC EYE EXAM  11/15/2023   • INFLUENZA VACCINE  Completed                CMS Preventative Services Quick Reference  Risk Factors Identified During Encounter  Hearing Problem: pt does not want referral at this time.  Immunizations Discussed/Encouraged: Tdap and Prevnar 20 (Pneumococcal 20-valent conjugate)  Inactivity/Sedentary: Patient was advised to exercise at least 150 minutes a week per CDC recommendations.  The above risks/problems have been discussed with the patient.  Pertinent information has been shared with the patient in the After Visit Summary.  An After Visit Summary and PPPS were made available to the patient.    Follow Up:   Next Medicare Wellness visit to be scheduled in 1 year.       Additional E&M Note during same encounter follows:  Patient has multiple medical problems which are significant and separately identifiable that require additional work above and beyond the Medicare Wellness Visit.      Chief Complaint  Medicare Wellness-subsequent    Subjective        HPI  Pedro Gould is also being seen today for an annual adult preventative physical exam. Overall he feels well. Problem list, medication list, and PMFSH have been reviewed. All recent labs(if applicable) and prescription refills(if needed) have been addressed during today's office visit.  The following were discussed during today's preventative wellness exam: Nutrition, Physical activity, Healthy weight, Immunizations and Screenings     Review of Systems   Constitutional: Negative for fatigue.   HENT: Positive for hearing loss.    Eyes: Negative.    Respiratory: Negative for cough and shortness of breath.    Cardiovascular: Negative for chest pain and palpitations.   Gastrointestinal: Positive for diarrhea (since removal of gallbladder).   Genitourinary:  "       Nocturia x 1       Musculoskeletal: Negative for arthralgias and myalgias.   Skin: Negative for rash.        Dry skin and itching   Neurological: Negative for dizziness.   Psychiatric/Behavioral: Negative for dysphoric mood.   All other systems reviewed and are negative.      Objective   Vital Signs:  /76   Pulse 62   Ht 177.8 cm (70\")   Wt 98 kg (216 lb)   SpO2 99%   BMI 30.99 kg/m²     Physical Exam  Constitutional:       Appearance: Normal appearance. He is well-developed.   HENT:      Head: Normocephalic.      Right Ear: Tympanic membrane and external ear normal. Decreased hearing noted.      Left Ear: Tympanic membrane and external ear normal. Decreased hearing noted.   Eyes:      General: Lids are normal.      Conjunctiva/sclera: Conjunctivae normal.      Pupils: Pupils are equal, round, and reactive to light.      Funduscopic exam:     Right eye: No AV nicking or papilledema.         Left eye: No AV nicking or papilledema.   Neck:      Thyroid: No thyroid mass or thyromegaly.      Vascular: No carotid bruit or JVD.      Trachea: Trachea normal.   Cardiovascular:      Rate and Rhythm: Normal rate and regular rhythm.      Pulses: Normal pulses.      Heart sounds: Normal heart sounds. No murmur heard.  Pulmonary:      Effort: Pulmonary effort is normal.      Breath sounds: Normal breath sounds.   Abdominal:      General: Bowel sounds are normal.      Palpations: Abdomen is soft.      Tenderness: There is no abdominal tenderness.   Musculoskeletal:         General: Normal range of motion.      Cervical back: Normal range of motion and neck supple.      Lumbar back: No bony tenderness.   Lymphadenopathy:      Cervical: No cervical adenopathy.      Upper Body:      Right upper body: No supraclavicular adenopathy.      Left upper body: No supraclavicular adenopathy.   Skin:     General: Skin is warm and dry.      Findings: No rash.      Nails: There is no clubbing.   Neurological:      Mental " Status: He is alert and oriented to person, place, and time.      Cranial Nerves: No cranial nerve deficit.      Deep Tendon Reflexes:      Reflex Scores:       Patellar reflexes are 2+ on the right side and 2+ on the left side.  Psychiatric:         Speech: Speech normal.         Behavior: Behavior normal.         Thought Content: Thought content normal.         Judgment: Judgment normal.          The following data was reviewed by: Hesham Delacruz MD on 02/08/2023:        Lipid Panel (11/14/2022 08:57)  Comprehensive Metabolic Panel (11/14/2022 08:57)  Hemoglobin A1c (11/14/2022 08:57)         Assessment and Plan   Diagnoses and all orders for this visit:    1. Medicare annual wellness visit, subsequent (Primary)    2. Mixed hyperlipidemia  Assessment & Plan:  The current medical regimen is effective;  continue present plan and medications.      Orders:  -     rosuvastatin (CRESTOR) 5 MG tablet; Take 1 tablet by mouth Every Night.  Dispense: 90 tablet; Refill: 3    3. Vitamin D deficiency  Assessment & Plan:  Continue with once weekly vitamin D.  Patient is able to get this medication over-the-counter.    Orders:  -     Vitamin D,25-Hydroxy    4. Immunization due  -     Pneumococcal Conjugate Vaccine 20-Valent All    5. Nocturia  -     PSA DIAGNOSTIC           Follow Up   Return in about 1 year (around 2/8/2024) for Medicare Wellness & regular visit, [id number] min.  Patient was given instructions and counseling regarding his condition or for health maintenance advice. Please see specific information pulled into the AVS if appropriate.

## 2023-02-08 NOTE — PATIENT INSTRUCTIONS
Advance Care Planning and Advance Directives     You make decisions on a daily basis - decisions about where you want to live, your career, your home, your life. Perhaps one of the most important decisions you face is your choice for future medical care. Take time to talk with your family and your healthcare team and start planning today.  Advance Care Planning is a process that can help you:  · Understand possible future healthcare decisions in light of your own experiences  · Reflect on those decision in light of your goals and values  · Discuss your decisions with those closest to you and the healthcare professionals that care for you  · Make a plan by creating a document that reflects your wishes    Surrogate Decision Maker  In the event of a medical emergency, which has left you unable to communicate or to make your own decisions, you would need someone to make decisions for you.  It is important to discuss your preferences for medical treatment with this person while you are in good health.     Qualities of a surrogate decision maker:  • Willing to take on this role and responsibility  • Knows what you want for future medical care  • Willing to follow your wishes even if they don't agree with them  • Able to make difficult medical decisions under stressful circumstances    Advance Directives  These are legal documents you can create that will guide your healthcare team and decision maker(s) when needed. These documents can be stored in the electronic medical record.    · Living Will - a legal document to guide your care if you have a terminal condition or a serious illness and are unable to communicate. States vary by statute in document names/types, but most forms may include one or more of the following:        -  Directions regarding life-prolonging treatments        -  Directions regarding artificially provided nutrition/hydration        -  Choosing a healthcare decision maker        -  Direction  regarding organ/tissue donation    · Durable Power of  for Healthcare - this document names an -in-fact to make medical decisions for you, but it may also allow this person to make personal and financial decisions for you. Please seek the advice of an  if you need this type of document.    **Advance Directives are not required and no one may discriminate against you if you do not sign one.    Medical Orders  Many states allow specific forms/orders signed by your physician to record your wishes for medical treatment in your current state of health. This form, signed in personal communication with your physician, addresses resuscitation and other medical interventions that you may or may not want.      For more information or to schedule a time with a [facility] Advance Care Planning Facilitator contact: [facility] or call [phone] and someone will contact you directly.  You are due for adacel Tdap vaccination. (provides protection against tetanus, diptheria and whooping cough) Please  get the immunization at your local pharmacy at your earliest convenience. This immunization will next be due in 10 years. Please click on the link for more information about this vaccine.    [facility] [place] Tdap Vaccine Information      Medicare Wellness  Personal Prevention Plan of Service     Date of Office Visit:    Encounter Provider:  Hesham Delacruz MD  Place of Service:  [facility]  Patient Name: Pedro Gould  :  1940    As part of the Medicare Wellness portion of your visit today, we are providing you with this personalized preventive plan of services (PPPS). This plan is based upon recommendations of the [place] Preventive Services Task Force (USPSTF) and the Advisory Committee on Immunization Practices (ACIP).    This lists the preventive care services that should be considered, and provides dates of when you are due. Items listed as completed are  up-to-date and do not require any further intervention.    Health Maintenance   Topic Date Due   • TDAP/TD VACCINES (1 - Tdap) Never done   • Pneumococcal Vaccine 65+ (2 - PCV) 10/17/2012   • ANNUAL WELLNESS VISIT  Never done   • ZOSTER VACCINE (2 of 3) 02/08/2028 (Originally 5/7/2012)   • HEMOGLOBIN A1C  05/14/2023   • URINE MICROALBUMIN  08/12/2023   • LIPID PANEL  11/14/2023   • DIABETIC EYE EXAM  11/15/2023   • COVID-19 Vaccine  Completed   • INFLUENZA VACCINE  Completed       Orders Placed This Encounter   Procedures   • Pneumococcal Conjugate Vaccine 20-Valent All       No follow-ups on file.

## 2023-02-08 NOTE — PROGRESS NOTES
Immunization  Immunization performed in LD by Codey Ferreira MA. Patient tolerated the procedure well without complications.  02/08/23   Codey Ferreira MA

## 2023-02-09 LAB
25(OH)D3+25(OH)D2 SERPL-MCNC: 44.2 NG/ML (ref 30–100)
PSA SERPL-MCNC: 0.56 NG/ML (ref 0–4)

## 2023-03-02 ENCOUNTER — TELEPHONE (OUTPATIENT)
Dept: ENDOCRINOLOGY | Age: 83
End: 2023-03-02
Payer: MEDICARE

## 2023-03-02 DIAGNOSIS — E11.9 CONTROLLED TYPE 2 DIABETES MELLITUS WITHOUT COMPLICATION, WITH LONG-TERM CURRENT USE OF INSULIN: ICD-10-CM

## 2023-03-02 DIAGNOSIS — Z79.4 CONTROLLED TYPE 2 DIABETES MELLITUS WITHOUT COMPLICATION, WITH LONG-TERM CURRENT USE OF INSULIN: ICD-10-CM

## 2023-03-02 DIAGNOSIS — E78.2 MIXED HYPERLIPIDEMIA: Primary | ICD-10-CM

## 2023-03-07 ENCOUNTER — LAB (OUTPATIENT)
Dept: ENDOCRINOLOGY | Age: 83
End: 2023-03-07
Payer: MEDICARE

## 2023-03-07 DIAGNOSIS — E11.9 CONTROLLED TYPE 2 DIABETES MELLITUS WITHOUT COMPLICATION, WITH LONG-TERM CURRENT USE OF INSULIN: ICD-10-CM

## 2023-03-07 DIAGNOSIS — Z79.4 CONTROLLED TYPE 2 DIABETES MELLITUS WITHOUT COMPLICATION, WITH LONG-TERM CURRENT USE OF INSULIN: ICD-10-CM

## 2023-03-07 DIAGNOSIS — E78.2 MIXED HYPERLIPIDEMIA: ICD-10-CM

## 2023-03-07 LAB
ALBUMIN SERPL-MCNC: 4.8 G/DL (ref 3.5–5.2)
ALBUMIN/GLOB SERPL: 2.5 G/DL
ALP SERPL-CCNC: 89 U/L (ref 39–117)
ALT SERPL-CCNC: 12 U/L (ref 1–41)
AST SERPL-CCNC: 13 U/L (ref 1–40)
BILIRUB SERPL-MCNC: 1.3 MG/DL (ref 0–1.2)
BUN SERPL-MCNC: 17 MG/DL (ref 8–23)
BUN/CREAT SERPL: 18.1 (ref 7–25)
CALCIUM SERPL-MCNC: 9.6 MG/DL (ref 8.6–10.5)
CHLORIDE SERPL-SCNC: 103 MMOL/L (ref 98–107)
CHOLEST SERPL-MCNC: 131 MG/DL (ref 0–200)
CO2 SERPL-SCNC: 27.3 MMOL/L (ref 22–29)
CREAT SERPL-MCNC: 0.94 MG/DL (ref 0.76–1.27)
EGFRCR SERPLBLD CKD-EPI 2021: 81.4 ML/MIN/1.73
GLOBULIN SER CALC-MCNC: 1.9 GM/DL
GLUCOSE SERPL-MCNC: 92 MG/DL (ref 65–99)
HBA1C MFR BLD: 5.7 % (ref 4.8–5.6)
HDLC SERPL-MCNC: 41 MG/DL (ref 40–60)
IMP & REVIEW OF LAB RESULTS: NORMAL
LDLC SERPL CALC-MCNC: 71 MG/DL (ref 0–100)
POTASSIUM SERPL-SCNC: 5.3 MMOL/L (ref 3.5–5.2)
PROT SERPL-MCNC: 6.7 G/DL (ref 6–8.5)
SODIUM SERPL-SCNC: 140 MMOL/L (ref 136–145)
TRIGL SERPL-MCNC: 101 MG/DL (ref 0–150)
VLDLC SERPL CALC-MCNC: 19 MG/DL (ref 5–40)

## 2023-03-14 ENCOUNTER — OFFICE VISIT (OUTPATIENT)
Dept: ENDOCRINOLOGY | Age: 83
End: 2023-03-14
Payer: MEDICARE

## 2023-03-14 VITALS
HEART RATE: 56 BPM | DIASTOLIC BLOOD PRESSURE: 70 MMHG | TEMPERATURE: 97.5 F | SYSTOLIC BLOOD PRESSURE: 122 MMHG | BODY MASS INDEX: 30.67 KG/M2 | HEIGHT: 70 IN | WEIGHT: 214.2 LBS | OXYGEN SATURATION: 97 %

## 2023-03-14 DIAGNOSIS — E11.40 CONTROLLED TYPE 2 DIABETES MELLITUS WITH NEUROPATHY: Primary | ICD-10-CM

## 2023-03-14 DIAGNOSIS — E78.2 MIXED HYPERLIPIDEMIA: ICD-10-CM

## 2023-03-14 PROCEDURE — 99214 OFFICE O/P EST MOD 30 MIN: CPT | Performed by: NURSE PRACTITIONER

## 2023-03-14 NOTE — PROGRESS NOTES
Chief Complaint  Chief Complaint   Patient presents with   • Diabetes     Type 2: Pt doesn't have meter, checks bs 2 times a day, is up to date on eye exam, no hx of retinopathy or neuropathy.        Subjective          History of Present Illness    Pedro Gould 83 y.o.  presents for a follow-up evaluation for type 2 DM     He has been diabetic since 05/2022 and started insulin in at that time.  C.Peptide normal in 06/22  Pt had a Aunt that had diabetes.     Pt is on the following medications for their DM: Lantus 14 units daily        Had stopped taking insulin from Nov to mid Dec to see how his BGs would respond and if he could control his diabetes with just diet. He went back on insulin at the end of Dec.       Metformin stopped due to metallic mouth taste        Pt complains of intermittent diarrhea due to not having a gallbladder and intermittent numbness and tingling in feet, no pain    Denies constipation, chest pain and shortness of breath    Weight gain of 6 lbs since last visit.    Pt has no history of diabetic retinopathy. 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 - may have to have surgery on this eye  History of rentinal breaks/tears in both eyes  History of cataract surgery     Pt does not have a history of nephropathy.  Patient is not currently taking ACE/ARB     Patient has neuropathy that was diagnosed before the diabetes diagnosis - right foot due knee surgery     Pt does not have a history of CAD or CVA.    Last A1C in 03/23 was 5.7    Last microalbumin in 08/22 was negative          Blood Sugars    Blood glucoses are checked 3/day.    Fasting blood glucoses: 80s - 110s    Pre-meal blood glucoses: 90s - 130s    Pt has no episodes of hypoglycemia.          Pt walks for exercise            Hyperlipidemia     Pt is currently taking  Crestor 5 mg HS - stopped on his own because he didn't want to take it    Last lipid  "panel in 03/23 showed Total 131, HDL 41, LDL 71 and Triglycerides 101                I have reviewed the patient's allergies, medicines, past medical hx, family hx and social hx.    Objective   Vital Signs:   /70   Pulse 56   Temp 97.5 °F (36.4 °C) (Temporal)   Ht 177.8 cm (70\")   Wt 97.2 kg (214 lb 3.2 oz)   SpO2 97%   BMI 30.73 kg/m²       Physical Exam   Physical Exam  Constitutional:       General: He is not in acute distress.     Appearance: Normal appearance. He is not diaphoretic.   HENT:      Head: Normocephalic and atraumatic.   Eyes:      General:         Right eye: No discharge.         Left eye: No discharge.   Skin:     General: Skin is warm and dry.   Neurological:      Mental Status: He is alert.   Psychiatric:         Mood and Affect: Mood normal.         Behavior: Behavior normal.                    Results Review:   Hemoglobin A1C   Date Value Ref Range Status   03/07/2023 5.70 (H) 4.80 - 5.60 % Final     Comment:     Hemoglobin A1C Ranges:  Increased Risk for Diabetes  5.7% to 6.4%  Diabetes                     >= 6.5%  Diabetic Goal                < 7.0%     12/04/2020 6.20 (H) 4.80 - 5.60 % Final     Triglycerides   Date Value Ref Range Status   03/07/2023 101 0 - 150 mg/dL Final     HDL Cholesterol   Date Value Ref Range Status   03/07/2023 41 40 - 60 mg/dL Final     LDL Chol Calc (NIH)   Date Value Ref Range Status   03/07/2023 71 0 - 100 mg/dL Final     VLDL Cholesterol Ned   Date Value Ref Range Status   03/07/2023 19 5 - 40 mg/dL Final     LDL/HDL Ratio   Date Value Ref Range Status   11/17/2016 2.18  Final         Assessment and Plan {CC Problem List  Visit Diagnosis  ROS  Review (Popup)  Health Maintenance  Quality  BestPractice  Medications  SmartSets  SnapShot Encounters  Media :23  Diagnoses and all orders for this visit:    1. 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 Mixed hyperlipidemia  -     Comprehensive Metabolic Panel; Future  -     Lipid Panel; Future    Lipid panel is good.    Pt stopped statin on his own stating that he didn't think that he needed it because his labs were always good.  Advised that even though his labs were good, being diabetic increase his risk of heart attack/stroke and the older we get the risk also increases.  The low dose statin was started to help prevent heart attack/stroke.  Pt still does not believe that he needs to be on statin, but says he will think about it.        No refills needed at this time      RTC in 6 months with Dr. Leyva, labs prior      Follow Up     Patient was given instructions and counseling regarding her condition or for health maintenance advice. Please see specific information pulled into the AVS if appropriate.              Dannielle Yeager, JAIME  03/14/23

## 2023-04-17 NOTE — PROGRESS NOTES
"Chief Complaint   Patient presents with   • Hip Pain     left       Subjective   This patient presents the office with chief complaint of left hip low back pain for the past several days prior to New Year's Day.  He had been lifting his grandson more often his left arm recently.  No particular injury.  Symptoms are located along the left low back and hip area and there is some numbness tingling sensation when he first arises from a sitting position.  Lying flat makes it better.  He has noted mild weakness in the left lower extremity but no other symptoms.Numbness goes away within a few seconds after he sits down. Numbness is present within a few seconds after he stands up. He notices it less after he walks about 20 minutes. Leg weakness occurs after about 20 minutes of walking.   Review of Systems   Musculoskeletal: Positive for back pain.   Neurological: Positive for numbness.       Objective   Visit Vitals   • /81   • Pulse 88   • Temp 98.7 °F (37.1 °C) (Oral)   • Resp 16   • Ht 70.5\" (179.1 cm)   • Wt 238 lb (108 kg)   • BMI 33.67 kg/m2     Body mass index is 33.67 kg/(m^2).  Physical Exam   Musculoskeletal:        Lumbar back: He exhibits normal range of motion and no tenderness.   Negative SLR; normal left leg/hip range of motion   Neurological: He has normal strength.   Reflex Scores:       Patellar reflexes are 2+ on the right side and 2+ on the left side.       Achilles reflexes are 2+ on the left side.  Normal heel/toe strength, mild imbalance standing on heels.    Skin:   No rash on back       Assessment/Plan     Problem List Items Addressed This Visit        Other    Acute left-sided low back pain with sciatica - Primary    Relevant Medications    cyclobenzaprine (FLEXERIL) 10 MG tablet    MethylPREDNISolone (MEDROL, JENNIFER,) 4 MG tablet          Outpatient Encounter Prescriptions as of 1/9/2017   Medication Sig Dispense Refill   • Esomeprazole Magnesium (NEXIUM 24HR PO) Take 1 capsule by mouth As " Needed.     • cyclobenzaprine (FLEXERIL) 10 MG tablet Take 1 tablet by mouth 3 (Three) Times a Day As Needed for muscle spasms for up to 10 days. 30 tablet 0   • MethylPREDNISolone (MEDROL, JENNIFER,) 4 MG tablet Take as directed on package instructions. 21 tablet 0   • [DISCONTINUED] ergocalciferol (ERGOCALCIFEROL) 51941 UNITS capsule Take 50,000 Units by mouth once a week.       No facility-administered encounter medications on file as of 1/9/2017.        No orders of the defined types were placed in this encounter.      Continue with current treatment plan.         RTC as needed or sooner if symptoms worsen or change or 2 weeks if sx persist   600

## 2023-08-31 DIAGNOSIS — E78.2 MIXED HYPERLIPIDEMIA: ICD-10-CM

## 2023-08-31 DIAGNOSIS — E11.40 CONTROLLED TYPE 2 DIABETES MELLITUS WITH NEUROPATHY: ICD-10-CM

## 2023-09-01 LAB
ALBUMIN SERPL-MCNC: 4.5 G/DL (ref 3.5–5.2)
ALBUMIN/CREAT UR: 4 MG/G CREAT (ref 0–29)
ALBUMIN/GLOB SERPL: 2.5 G/DL
ALP SERPL-CCNC: 95 U/L (ref 39–117)
ALT SERPL-CCNC: 12 U/L (ref 1–41)
AST SERPL-CCNC: 14 U/L (ref 1–40)
BILIRUB SERPL-MCNC: 1.2 MG/DL (ref 0–1.2)
BUN SERPL-MCNC: 16 MG/DL (ref 8–23)
BUN/CREAT SERPL: 17 (ref 7–25)
CALCIUM SERPL-MCNC: 9.1 MG/DL (ref 8.6–10.5)
CHLORIDE SERPL-SCNC: 103 MMOL/L (ref 98–107)
CHOLEST SERPL-MCNC: 126 MG/DL (ref 0–200)
CO2 SERPL-SCNC: 25.3 MMOL/L (ref 22–29)
CREAT SERPL-MCNC: 0.94 MG/DL (ref 0.76–1.27)
CREAT UR-MCNC: 79.3 MG/DL
EGFRCR SERPLBLD CKD-EPI 2021: 81.4 ML/MIN/1.73
GLOBULIN SER CALC-MCNC: 1.8 GM/DL
GLUCOSE SERPL-MCNC: 105 MG/DL (ref 65–99)
HBA1C MFR BLD: 5.8 % (ref 4.8–5.6)
HDLC SERPL-MCNC: 37 MG/DL (ref 40–60)
IMP & REVIEW OF LAB RESULTS: NORMAL
LDLC SERPL CALC-MCNC: 71 MG/DL (ref 0–100)
MICROALBUMIN UR-MCNC: 3.2 UG/ML
POTASSIUM SERPL-SCNC: 4.5 MMOL/L (ref 3.5–5.2)
PROT SERPL-MCNC: 6.3 G/DL (ref 6–8.5)
SODIUM SERPL-SCNC: 140 MMOL/L (ref 136–145)
TRIGL SERPL-MCNC: 97 MG/DL (ref 0–150)
TSH SERPL DL<=0.005 MIU/L-ACNC: 2.81 UIU/ML (ref 0.27–4.2)
VLDLC SERPL CALC-MCNC: 18 MG/DL (ref 5–40)

## 2023-09-14 ENCOUNTER — OFFICE VISIT (OUTPATIENT)
Dept: ENDOCRINOLOGY | Age: 83
End: 2023-09-14
Payer: MEDICARE

## 2023-09-14 ENCOUNTER — PRIOR AUTHORIZATION (OUTPATIENT)
Dept: ENDOCRINOLOGY | Age: 83
End: 2023-09-14

## 2023-09-14 VITALS
TEMPERATURE: 96.9 F | HEIGHT: 70 IN | OXYGEN SATURATION: 97 % | BODY MASS INDEX: 30.49 KG/M2 | HEART RATE: 58 BPM | WEIGHT: 213 LBS | DIASTOLIC BLOOD PRESSURE: 80 MMHG | SYSTOLIC BLOOD PRESSURE: 100 MMHG

## 2023-09-14 DIAGNOSIS — E11.65 TYPE 2 DIABETES MELLITUS WITH HYPERGLYCEMIA, WITH LONG-TERM CURRENT USE OF INSULIN: ICD-10-CM

## 2023-09-14 DIAGNOSIS — Z79.4 TYPE 2 DIABETES MELLITUS WITH HYPERGLYCEMIA, WITH LONG-TERM CURRENT USE OF INSULIN: ICD-10-CM

## 2023-09-14 RX ORDER — PEN NEEDLE, DIABETIC 32 GX 1/6"
1 NEEDLE, DISPOSABLE MISCELLANEOUS NIGHTLY
Qty: 100 EACH | Refills: 3 | Status: SHIPPED | OUTPATIENT
Start: 2023-09-14

## 2023-09-14 RX ORDER — BLOOD SUGAR DIAGNOSTIC
STRIP MISCELLANEOUS
Qty: 300 EACH | Refills: 3 | Status: SHIPPED | OUTPATIENT
Start: 2023-09-14

## 2023-09-14 RX ORDER — INSULIN GLARGINE 100 [IU]/ML
INJECTION, SOLUTION SUBCUTANEOUS
Qty: 15 ML | Refills: 2 | Status: SHIPPED | OUTPATIENT
Start: 2023-09-14 | End: 2023-09-18

## 2023-09-14 NOTE — PROGRESS NOTES
Chief complaint:  T2DM    HPI:   - 83 year old male here for management of diabetes mellitus type 2  - Has had diabetes for just over 1 year  - No known complications to date  - He was taking Lantus 14 units at night but he has been out of it for 2 months and his BG levels in the am are still mostly in the 's  - Denies hypoglycemia      The following portions of the patient's history were reviewed and updated as appropriate: allergies, current medications, past family history, past medical history, past social history, past surgical history, and problem list.    Objective     Vitals:    09/14/23 0834   BP: 100/80   Pulse: 58   Temp: 96.9 °F (36.1 °C)   SpO2: 97%        Physical Exam  Vitals reviewed.   Constitutional:       Appearance: Normal appearance.   HENT:      Head: Normocephalic and atraumatic.   Eyes:      General: No scleral icterus.  Pulmonary:      Effort: Pulmonary effort is normal. No respiratory distress.   Neurological:      Mental Status: He is alert.      Gait: Gait normal.   Psychiatric:         Mood and Affect: Mood normal.         Behavior: Behavior normal.         Thought Content: Thought content normal.         Judgment: Judgment normal.       Labs/Imaging:  A1c was 5.8% and GFR was 81.4 in 8/2023    Assessment & Plan   T2DM, controlled  - His A1c and BG values are pretty much unchanged with him off Lantus the past 2 months  - I told him he can hold his Lantus and continue to monitor his BG since he may not need insulin to control his BG levels  - I told him he can contanct us or simply restart his Lantus if his BG levels are consistently higher    - Return to clinic in 3 months

## 2023-09-14 NOTE — TELEPHONE ENCOUNTER
PA FOR INSULIN GLARGINE 100 UNIT HAS BEEN SUBMITTED TO INSURANCE FOR APPROVAL.    - Pedro Cunninghamnes Key: [id number] - PA Case ID: [id number] - Rx #: [id number] help? Call us at [phone]  Status  Sent to [employer]  Drug  Insulin Glargine 100UNIT/ML solution  Form  [employer] Electronic PA Form  Original Claim Info  569,MR Try LANTUS U-100 INSULIN,LEVEMIR U-100 INSULIN,TRESIBA U-100 INSULIN,TRESIBA FLEXTOUCH U-100,TRESIBA FLEXTOUCH U-200,TOUJEO SOLOSTAR U-300 INSULIN,TOUJEO MAX U-300 SOLOSTAR,LEVEMIR FLEXPENNONFORMMadison Health  Health Plan’s Preferred Products  LANTUS [id number]  LEVEMIR [id number]  TRESIBA [id number]  TRESIBA FLEXTOUCH U-100 [id number]  TRESIBA FLEXTOUCH U-200 [id number]

## 2023-09-18 DIAGNOSIS — Z79.4 TYPE 2 DIABETES MELLITUS WITH HYPERGLYCEMIA, WITH LONG-TERM CURRENT USE OF INSULIN: Primary | ICD-10-CM

## 2023-09-18 DIAGNOSIS — E11.65 TYPE 2 DIABETES MELLITUS WITH HYPERGLYCEMIA, WITH LONG-TERM CURRENT USE OF INSULIN: Primary | ICD-10-CM

## 2024-01-16 DIAGNOSIS — Z79.4 TYPE 2 DIABETES MELLITUS WITH HYPERGLYCEMIA, WITH LONG-TERM CURRENT USE OF INSULIN: ICD-10-CM

## 2024-01-16 DIAGNOSIS — E11.65 TYPE 2 DIABETES MELLITUS WITH HYPERGLYCEMIA, WITH LONG-TERM CURRENT USE OF INSULIN: ICD-10-CM

## 2024-01-16 RX ORDER — BLOOD SUGAR DIAGNOSTIC
STRIP MISCELLANEOUS
Qty: 300 EACH | Refills: 3 | Status: SHIPPED | OUTPATIENT
Start: 2024-01-16

## 2024-01-16 NOTE — TELEPHONE ENCOUNTER
Rx Refill Note  Requested Prescriptions     Pending Prescriptions Disp Refills    glucose blood (Accu-Chek Guide) test strip 300 each 3     Sig: Dx code E11.65 testing bs 3 x day      Last office visit with prescribing clinician: 9/14/2023   Last telemedicine visit with prescribing clinician: Visit date not found   Next office visit with prescribing clinician: Visit date not found                         Would you like a call back once the refill request has been completed: [] Yes [] No    If the office needs to give you a call back, can they leave a voicemail: [] Yes [] No    Shirley Germain MA  01/16/24, 14:22 EST

## 2024-02-12 ENCOUNTER — OFFICE VISIT (OUTPATIENT)
Dept: ENDOCRINOLOGY | Age: 84
End: 2024-02-12
Payer: MEDICARE

## 2024-02-12 VITALS
WEIGHT: 221.2 LBS | BODY MASS INDEX: 31.67 KG/M2 | SYSTOLIC BLOOD PRESSURE: 124 MMHG | OXYGEN SATURATION: 100 % | HEIGHT: 70 IN | DIASTOLIC BLOOD PRESSURE: 64 MMHG | HEART RATE: 60 BPM | TEMPERATURE: 97.1 F

## 2024-02-12 DIAGNOSIS — E11.40 CONTROLLED TYPE 2 DIABETES MELLITUS WITH NEUROPATHY: Primary | ICD-10-CM

## 2024-02-12 DIAGNOSIS — E78.2 MIXED HYPERLIPIDEMIA: ICD-10-CM

## 2024-02-12 PROCEDURE — 99214 OFFICE O/P EST MOD 30 MIN: CPT | Performed by: NURSE PRACTITIONER

## 2024-05-03 ENCOUNTER — OFFICE VISIT (OUTPATIENT)
Dept: FAMILY MEDICINE CLINIC | Facility: CLINIC | Age: 84
End: 2024-05-03
Payer: MEDICARE

## 2024-05-03 VITALS
DIASTOLIC BLOOD PRESSURE: 80 MMHG | OXYGEN SATURATION: 95 % | WEIGHT: 217.8 LBS | HEART RATE: 72 BPM | TEMPERATURE: 98.1 F | SYSTOLIC BLOOD PRESSURE: 128 MMHG | BODY MASS INDEX: 31.18 KG/M2 | HEIGHT: 70 IN

## 2024-05-03 DIAGNOSIS — R22.32 LOCALIZED SWELLING ON LEFT HAND: Primary | ICD-10-CM

## 2024-05-03 PROCEDURE — 1159F MED LIST DOCD IN RCRD: CPT | Performed by: NURSE PRACTITIONER

## 2024-05-03 PROCEDURE — 1160F RVW MEDS BY RX/DR IN RCRD: CPT | Performed by: NURSE PRACTITIONER

## 2024-05-03 PROCEDURE — 99213 OFFICE O/P EST LOW 20 MIN: CPT | Performed by: NURSE PRACTITIONER

## 2024-05-03 RX ORDER — INDOMETHACIN 25 MG/1
25 CAPSULE ORAL 3 TIMES DAILY PRN
Qty: 30 CAPSULE | Refills: 0 | Status: SHIPPED | OUTPATIENT
Start: 2024-05-03

## 2024-05-03 RX ORDER — CEPHALEXIN 500 MG/1
500 CAPSULE ORAL 2 TIMES DAILY
Qty: 20 CAPSULE | Refills: 0 | Status: SHIPPED | OUTPATIENT
Start: 2024-05-03

## 2024-05-03 NOTE — PROGRESS NOTES
Subjective   Pedro Gould is a 84 y.o. male.     History of Present Illness   Patient presents to office for redness and swelling to left dorsal hand and second finger.  He states it has actually improved slightly today but he was concerned as he has had infection of the hardware in his knee previously and had to be on IV antibiotics.  He states finger is tender if he pushes on it but otherwise not significantly painful.  He does not have history of gout.  The following portions of the patient's history were reviewed and updated as appropriate: allergies, current medications, past family history, past medical history, past social history, past surgical history, and problem list.    Review of Systems   Constitutional:  Negative for fatigue.   Respiratory:  Negative for cough and shortness of breath.    Cardiovascular:  Negative for chest pain and palpitations.   Musculoskeletal:  Positive for arthralgias.   Skin:  Positive for color change.   Psychiatric/Behavioral:  Negative for dysphoric mood and sleep disturbance. The patient is not nervous/anxious.        Objective   Physical Exam  Vitals and nursing note reviewed.   Constitutional:       Appearance: Normal appearance. He is well-developed.   Pulmonary:      Effort: Pulmonary effort is normal.   Musculoskeletal:      Left hand: Swelling and tenderness present. No deformity or lacerations. Decreased range of motion.        Arms:    Skin:     General: Skin is warm and dry.   Neurological:      Mental Status: He is alert and oriented to person, place, and time.   Psychiatric:         Mood and Affect: Mood normal.         Behavior: Behavior normal.         Thought Content: Thought content normal.         Judgment: Judgment normal.         Assessment & Plan   Diagnoses and all orders for this visit:    1. Localized swelling on left hand (Primary)  -     cephalexin (Keflex) 500 MG capsule; Take 1 capsule by mouth 2 (Two) Times a Day.  Dispense: 20 capsule; Refill:  0  -     indomethacin (INDOCIN) 25 MG capsule; Take 1 capsule by mouth 3 (Three) Times a Day As Needed for Mild Pain.  Dispense: 30 capsule; Refill: 0    Discussed cellulitis versus gout.  Will go ahead and treat with antibiotic and anti-inflammatory.  Patient will follow-up Monday if worsening or no improvement.

## 2024-06-06 DIAGNOSIS — E78.2 MIXED HYPERLIPIDEMIA: ICD-10-CM

## 2024-06-06 DIAGNOSIS — E11.40 CONTROLLED TYPE 2 DIABETES MELLITUS WITH NEUROPATHY: ICD-10-CM

## 2024-06-07 LAB
ALBUMIN SERPL-MCNC: 4.4 G/DL (ref 3.5–5.2)
ALBUMIN/GLOB SERPL: 2.2 G/DL
ALP SERPL-CCNC: 90 U/L (ref 39–117)
ALT SERPL-CCNC: 11 U/L (ref 1–41)
AST SERPL-CCNC: 14 U/L (ref 1–40)
BILIRUB SERPL-MCNC: 1 MG/DL (ref 0–1.2)
BUN SERPL-MCNC: 19 MG/DL (ref 8–23)
BUN/CREAT SERPL: 22.6 (ref 7–25)
CALCIUM SERPL-MCNC: 9.5 MG/DL (ref 8.6–10.5)
CHLORIDE SERPL-SCNC: 102 MMOL/L (ref 98–107)
CHOLEST SERPL-MCNC: 139 MG/DL (ref 0–200)
CO2 SERPL-SCNC: 27.7 MMOL/L (ref 22–29)
CREAT SERPL-MCNC: 0.84 MG/DL (ref 0.76–1.27)
EGFRCR SERPLBLD CKD-EPI 2021: 87.1 ML/MIN/1.73
GLOBULIN SER CALC-MCNC: 2 GM/DL
GLUCOSE SERPL-MCNC: 119 MG/DL (ref 65–99)
HBA1C MFR BLD: 6.5 % (ref 4.8–5.6)
HDLC SERPL-MCNC: 41 MG/DL (ref 40–60)
IMP & REVIEW OF LAB RESULTS: NORMAL
LDLC SERPL CALC-MCNC: 74 MG/DL (ref 0–100)
POTASSIUM SERPL-SCNC: 4.7 MMOL/L (ref 3.5–5.2)
PROT SERPL-MCNC: 6.4 G/DL (ref 6–8.5)
SODIUM SERPL-SCNC: 139 MMOL/L (ref 136–145)
TRIGL SERPL-MCNC: 136 MG/DL (ref 0–150)
VLDLC SERPL CALC-MCNC: 24 MG/DL (ref 5–40)

## 2024-06-13 ENCOUNTER — OFFICE VISIT (OUTPATIENT)
Dept: ENDOCRINOLOGY | Age: 84
End: 2024-06-13
Payer: MEDICARE

## 2024-06-13 VITALS
DIASTOLIC BLOOD PRESSURE: 80 MMHG | SYSTOLIC BLOOD PRESSURE: 130 MMHG | OXYGEN SATURATION: 97 % | BODY MASS INDEX: 31.22 KG/M2 | TEMPERATURE: 97.1 F | WEIGHT: 217.6 LBS | HEART RATE: 67 BPM

## 2024-06-13 DIAGNOSIS — Z79.4 TYPE 2 DIABETES MELLITUS WITH HYPERGLYCEMIA, WITH LONG-TERM CURRENT USE OF INSULIN: Primary | ICD-10-CM

## 2024-06-13 DIAGNOSIS — E11.65 TYPE 2 DIABETES MELLITUS WITH HYPERGLYCEMIA, WITH LONG-TERM CURRENT USE OF INSULIN: Primary | ICD-10-CM

## 2024-06-13 NOTE — PROGRESS NOTES
Chief complaint/Reason for consult: T2DM    HPI:   - 84 year old male here for management of diabetes mellitus type 2  - Was last seen in 9/2023  - Has had diabetes for just over 1 year  - No known complications to date  - Is not take on any medications for diabetes  - Denies hypoglycemia    The following portions of the patient's history were reviewed and updated as appropriate: allergies, current medications, past family history, past medical history, past social history, past surgical history, and problem list.    Objective     Vitals:    06/13/24 0813   BP: 130/80   Pulse: 67   Temp: 97.1 °F (36.2 °C)   SpO2: 97%        Physical Exam  Vitals reviewed.   Constitutional:       Appearance: Normal appearance.   HENT:      Head: Normocephalic and atraumatic.   Eyes:      General: No scleral icterus.  Pulmonary:      Effort: Pulmonary effort is normal. No respiratory distress.   Neurological:      Mental Status: He is alert.      Gait: Gait normal.   Psychiatric:         Mood and Affect: Mood normal.         Behavior: Behavior normal.         Thought Content: Thought content normal.         Judgment: Judgment normal.       Labs/Imaging:  A1c was 6.5% in 6/2024    Assessment & Plan   Type 2 diabetes mellitus  - Diet controlled    - Return to clinic in 6 months

## 2024-12-12 ENCOUNTER — TELEPHONE (OUTPATIENT)
Dept: ENDOCRINOLOGY | Age: 84
End: 2024-12-12
Payer: MEDICARE

## 2024-12-12 NOTE — TELEPHONE ENCOUNTER
Called pt to confirm appt for tomorrow. Was able to speak with pt and confirmed appt for tomorrow. Pt had no further questions or concerns.

## 2024-12-13 ENCOUNTER — OFFICE VISIT (OUTPATIENT)
Dept: ENDOCRINOLOGY | Age: 84
End: 2024-12-13
Payer: MEDICARE

## 2024-12-13 VITALS
OXYGEN SATURATION: 99 % | HEIGHT: 70 IN | BODY MASS INDEX: 31.78 KG/M2 | DIASTOLIC BLOOD PRESSURE: 82 MMHG | SYSTOLIC BLOOD PRESSURE: 124 MMHG | HEART RATE: 64 BPM | WEIGHT: 222 LBS

## 2024-12-13 DIAGNOSIS — Z79.4 TYPE 2 DIABETES MELLITUS WITH HYPERGLYCEMIA, WITH LONG-TERM CURRENT USE OF INSULIN: Primary | ICD-10-CM

## 2024-12-13 DIAGNOSIS — E11.65 TYPE 2 DIABETES MELLITUS WITH HYPERGLYCEMIA, WITH LONG-TERM CURRENT USE OF INSULIN: Primary | ICD-10-CM

## 2024-12-13 LAB
ALBUMIN SERPL-MCNC: 4.4 G/DL (ref 3.5–5.2)
ALBUMIN/GLOB SERPL: 2.1 G/DL
ALP SERPL-CCNC: 90 U/L (ref 39–117)
ALT SERPL-CCNC: 16 U/L (ref 1–41)
AST SERPL-CCNC: 16 U/L (ref 1–40)
BILIRUB SERPL-MCNC: 0.9 MG/DL (ref 0–1.2)
BUN SERPL-MCNC: 12 MG/DL (ref 8–23)
BUN/CREAT SERPL: 14.3 (ref 7–25)
CALCIUM SERPL-MCNC: 9.2 MG/DL (ref 8.6–10.5)
CHLORIDE SERPL-SCNC: 102 MMOL/L (ref 98–107)
CO2 SERPL-SCNC: 28.2 MMOL/L (ref 22–29)
CREAT SERPL-MCNC: 0.84 MG/DL (ref 0.76–1.27)
EGFRCR SERPLBLD CKD-EPI 2021: 87.1 ML/MIN/1.73
GLOBULIN SER CALC-MCNC: 2.1 GM/DL
GLUCOSE SERPL-MCNC: 123 MG/DL (ref 65–99)
HBA1C MFR BLD: 6.8 % (ref 4.8–5.6)
POTASSIUM SERPL-SCNC: 4.4 MMOL/L (ref 3.5–5.2)
PROT SERPL-MCNC: 6.5 G/DL (ref 6–8.5)
SODIUM SERPL-SCNC: 137 MMOL/L (ref 136–145)

## 2024-12-13 NOTE — PROGRESS NOTES
Chief complaint/Reason for consult: T2DM    HPI:   - 84 year old male here for management of diabetes mellitus type 2  - Was last seen in 6/2024  - Has had diabetes for just over 1 year  - No known complications to date  - Is not take on any medications for diabetes  - Denies hypoglycemia    The following portions of the patient's history were reviewed and updated as appropriate: allergies, current medications, past family history, past medical history, past social history, past surgical history, and problem list.      Objective     Vitals:    12/13/24 0811   BP: 124/82   Pulse: 64   SpO2: 99%        Physical Exam  Vitals reviewed.   Constitutional:       Appearance: Normal appearance.   HENT:      Head: Normocephalic and atraumatic.   Eyes:      General: No scleral icterus.  Pulmonary:      Effort: Pulmonary effort is normal. No respiratory distress.   Neurological:      Mental Status: He is alert.      Gait: Gait normal.   Psychiatric:         Mood and Affect: Mood normal.         Behavior: Behavior normal.         Thought Content: Thought content normal.         Judgment: Judgment normal.       Assessment & Plan   Type 2 diabetes mellitus  - Diet controlled  - Will check A1c today     - Return to clinic in 6 months

## 2025-03-13 ENCOUNTER — PATIENT MESSAGE (OUTPATIENT)
Dept: ENDOCRINOLOGY | Age: 85
End: 2025-03-13
Payer: MEDICARE

## 2025-03-13 DIAGNOSIS — E11.65 TYPE 2 DIABETES MELLITUS WITH HYPERGLYCEMIA, WITH LONG-TERM CURRENT USE OF INSULIN: ICD-10-CM

## 2025-03-13 DIAGNOSIS — Z79.4 TYPE 2 DIABETES MELLITUS WITH HYPERGLYCEMIA, WITH LONG-TERM CURRENT USE OF INSULIN: ICD-10-CM

## 2025-03-13 RX ORDER — LANCETS
EACH MISCELLANEOUS
Qty: 300 EACH | Refills: 3 | Status: SHIPPED | OUTPATIENT
Start: 2025-03-13

## 2025-03-13 RX ORDER — BLOOD SUGAR DIAGNOSTIC
STRIP MISCELLANEOUS
Qty: 300 EACH | Refills: 3 | Status: SHIPPED | OUTPATIENT
Start: 2025-03-13

## 2025-04-24 ENCOUNTER — OFFICE VISIT (OUTPATIENT)
Dept: FAMILY MEDICINE CLINIC | Facility: CLINIC | Age: 85
End: 2025-04-24
Payer: MEDICARE

## 2025-04-24 VITALS
OXYGEN SATURATION: 98 % | HEIGHT: 70 IN | DIASTOLIC BLOOD PRESSURE: 80 MMHG | HEART RATE: 55 BPM | WEIGHT: 215 LBS | BODY MASS INDEX: 30.78 KG/M2 | SYSTOLIC BLOOD PRESSURE: 122 MMHG

## 2025-04-24 DIAGNOSIS — R12 HEARTBURN: Primary | ICD-10-CM

## 2025-04-24 RX ORDER — PANTOPRAZOLE SODIUM 40 MG/1
40 TABLET, DELAYED RELEASE ORAL
Qty: 30 TABLET | Refills: 1 | Status: SHIPPED | OUTPATIENT
Start: 2025-04-24 | End: 2025-06-23

## 2025-04-24 NOTE — PROGRESS NOTES
Chief Complaint  Sinus Congestion (Symptoms for a month, getting better) and Heartburn (3 weeks ago, getting better, took nexium 14 day, hasn't had symptoms since Monday )    Subjective        HPI      The patient presents for evaluation of indigestion.    He reports experiencing indigestion, characterized by heartburn, which began approximately 4 weeks ago. This symptom is not new to him, as he had previously experienced it, but it had resolved following weight loss. The current episode is described as more severe and slightly different in nature. Previously, he could consume food without immediate discomfort, but now he experiences heartburn immediately after eating, regardless of his posture. He does not report any dysphagia or sensation of food sticking in the throat. His diet includes a high intake of coffee, and he has noticed a slight elevation in his blood sugar levels over the past month, prompting him to consider dietary modifications. His physical activity is limited to walking, and he does not experience any associated shortness of breath. He also does not report any jaw or arm discomfort, radiation of pain to the back, or nausea. He is not currently on any medications. He has found relief from Nexium, which he took for a 14-day course and completed on Monday. Prior to this, his sleep was disrupted due to the need to remain upright, but since starting Nexium, he has been able to sleep at night. Despite discontinuing Nexium, his symptoms have not returned to their previous severity.    He also reports significant sinus congestion and excessive salivation. He has noticed a decrease in the production of yellow mucus from his nose.    He is under the care of a specialist for diabetes, which is managed through dietary control. His most recent hemoglobin A1c level was 6.7.    MEDICATIONS  Past: Nexium           Vital Signs:   Vitals:    04/24/25 0833   BP: 122/80   Pulse: 55   SpO2: 98%   Weight: 97.5 kg (215  "lb)   Height: 177.8 cm (70\")            4/24/2025     8:40 AM   PHQ-2/PHQ-9 Depression Screening   Little interest or pleasure in doing things Not at all   Feeling down, depressed, or hopeless Not at all               Physical Exam     General Appearance: Normal appearance, No acute distress.  Respiratory: No respiratory distress, lungs clear to auscultation with no wheezes or rubs.  Gastrointestinal: Normal bowel sounds in all quadrants. No hepatosplenomegaly. No epigastric tenderness. No abdominal masses. No abdominal bruits auscultated.  Psychiatric: normal affect, pleasant, cooperative with exam.  Other observations: no carotid bruits auscultated.             Results  - Laboratory Studies:    - Hemoglobin A1c: 6.7                Assessment and Plan      1. Indigestion.  He is advised to avoid late-night eating and limit snacking after 7:00 PM. A reduction in caffeine intake is recommended, with a preference for decaffeinated beverages post-noon. Over-the-counter medications such as Aleve or ibuprofen should be avoided due to their potential to exacerbate stomach irritation. A breath test for H. pylori will be conducted. Pantoprazole 40 mg will be prescribed, to be taken 30 to 60 minutes prior to breakfast for a duration of 2 months.  Information regarding heartburn, GERD, reflux, and pantoprazole will be provided. If the H. pylori test returns positive, an antibiotic regimen will be initiated.      Follow-up  The patient will follow up in 3 months.         Heartburn    Orders:    H. Pylori Breath Test - Breath, Lung; Future    pantoprazole (PROTONIX) 40 MG EC tablet; Take 1 tablet by mouth Every Morning Before Breakfast for 60 days.       No follow-ups on file.     Patient was given instructions and counseling regarding his condition or for health maintenance advice. Please see specific information pulled into the AVS if appropriate.     Patient or patient representative verbalized consent for the use of Ambient " Listening during the visit with  Hesham Delacruz MD for chart documentation. 4/24/2025  09:16 EDT

## 2025-06-13 ENCOUNTER — OFFICE VISIT (OUTPATIENT)
Dept: ENDOCRINOLOGY | Age: 85
End: 2025-06-13
Payer: MEDICARE

## 2025-06-13 VITALS
DIASTOLIC BLOOD PRESSURE: 80 MMHG | BODY MASS INDEX: 31.38 KG/M2 | HEIGHT: 70 IN | WEIGHT: 219.2 LBS | SYSTOLIC BLOOD PRESSURE: 124 MMHG | HEART RATE: 68 BPM | OXYGEN SATURATION: 97 %

## 2025-06-13 DIAGNOSIS — E11.65 TYPE 2 DIABETES MELLITUS WITH HYPERGLYCEMIA, WITH LONG-TERM CURRENT USE OF INSULIN: Primary | ICD-10-CM

## 2025-06-13 DIAGNOSIS — Z79.4 TYPE 2 DIABETES MELLITUS WITH HYPERGLYCEMIA, WITH LONG-TERM CURRENT USE OF INSULIN: Primary | ICD-10-CM

## 2025-06-13 LAB — HBA1C MFR BLD: 6.8 % (ref 4.8–5.6)

## 2025-06-13 NOTE — PROGRESS NOTES
Chief complaint/Reason for consult: T2DM    HPI:   - 85 year old male here for management of diabetes mellitus type 2  - Was last seen in 12/2024  - No changes since last visit  - Has had diabetes since 2022  - No known complications to date  - Is not take on any medications for diabetes    The following portions of the patient's history were reviewed and updated as appropriate: allergies, current medications, past family history, past medical history, past social history, past surgical history, and problem list.      Objective     Vitals:    06/13/25 0815   BP: 124/80   Pulse: 68   SpO2: 97%        Physical Exam  Vitals reviewed.   Constitutional:       Appearance: Normal appearance.   HENT:      Head: Normocephalic and atraumatic.   Eyes:      General: No scleral icterus.  Pulmonary:      Effort: Pulmonary effort is normal. No respiratory distress.   Neurological:      Mental Status: He is alert.      Gait: Gait normal.   Psychiatric:         Mood and Affect: Mood normal.         Behavior: Behavior normal.         Thought Content: Thought content normal.         Judgment: Judgment normal.         Assessment & Plan   Type 2 diabetes mellitus  - Diet controlled  - Will check A1c today    2. Obesity (BMI of 31.45)  - Weight loss would improve glycemic control     - Return to clinic in 6 months

## (undated) DEVICE — ENDOCUT SCISSOR TIP, DISPOSABLE: Brand: RENEW

## (undated) DEVICE — VISUALIZATION SYSTEM: Brand: CLEARIFY

## (undated) DEVICE — ENDOPATH XCEL BLADELESS TROCARS WITH STABILITY SLEEVES: Brand: ENDOPATH XCEL

## (undated) DEVICE — STPCK 3WY D201 DISCOFIX

## (undated) DEVICE — DRP C/ARM 41X74IN

## (undated) DEVICE — 3M™ STERI-STRIP™ COMPOUND BENZOIN TINCTURE 40 BAGS/CARTON 4 CARTONS/CASE C1544: Brand: 3M™ STERI-STRIP™

## (undated) DEVICE — ENDOPATH XCEL UNIVERSAL TROCAR STABLILITY SLEEVES: Brand: ENDOPATH XCEL

## (undated) DEVICE — GLV SURG BIOGEL LTX PF 8

## (undated) DEVICE — ENDOPATH XCEL BLUNT TIP TROCARS WITH SMOOTH SLEEVES: Brand: ENDOPATH XCEL

## (undated) DEVICE — ENDOPOUCH RETRIEVER SPECIMEN RETRIEVAL BAGS: Brand: ENDOPOUCH RETRIEVER

## (undated) DEVICE — APPL HEMO SURG ARISTA/AH/FLEXITIP XL 38CM

## (undated) DEVICE — CONTAINER,SPECIMEN,OR STERILE,4OZ: Brand: MEDLINE

## (undated) DEVICE — CATH CHOLANG 4.5F18IN BRGNDY

## (undated) DEVICE — SOL NACL 0.9PCT 1000ML

## (undated) DEVICE — EXTENSION SET, MALE LUER LOCK ADAPTER WITH RETRACTABLE COLLAR

## (undated) DEVICE — LOU LAP CHOLE: Brand: MEDLINE INDUSTRIES, INC.

## (undated) DEVICE — SUT MNCRYL PLS ANTIB UD 4/0 PS2 18IN

## (undated) DEVICE — APPL CHLORAPREP HI/LITE 26ML ORNG

## (undated) DEVICE — CATH IV INSYTE AUTOGARD 14G 1 1/2IN ORNG

## (undated) DEVICE — SUT VIC 0 CT2 CR8 18IN DYED J727D

## (undated) DEVICE — DRAPE,REIN 53X77,STERILE: Brand: MEDLINE

## (undated) DEVICE — LAPAROSCOPIC SMOKE FILTRATION SYSTEM: Brand: PALL LAPAROSHIELD® PLUS LAPAROSCOPIC SMOKE FILTRATION SYSTEM